# Patient Record
Sex: FEMALE | Race: OTHER | HISPANIC OR LATINO
[De-identification: names, ages, dates, MRNs, and addresses within clinical notes are randomized per-mention and may not be internally consistent; named-entity substitution may affect disease eponyms.]

---

## 2017-10-02 ENCOUNTER — APPOINTMENT (OUTPATIENT)
Dept: MAMMOGRAPHY | Facility: CLINIC | Age: 62
End: 2017-10-02
Payer: COMMERCIAL

## 2017-10-02 ENCOUNTER — OUTPATIENT (OUTPATIENT)
Dept: OUTPATIENT SERVICES | Facility: HOSPITAL | Age: 62
LOS: 1 days | End: 2017-10-02
Payer: COMMERCIAL

## 2017-10-02 DIAGNOSIS — Z00.8 ENCOUNTER FOR OTHER GENERAL EXAMINATION: ICD-10-CM

## 2017-10-02 PROCEDURE — 77063 BREAST TOMOSYNTHESIS BI: CPT

## 2017-10-02 PROCEDURE — G0202: CPT | Mod: 26

## 2017-10-02 PROCEDURE — 77067 SCR MAMMO BI INCL CAD: CPT

## 2017-10-02 PROCEDURE — 77063 BREAST TOMOSYNTHESIS BI: CPT | Mod: 26

## 2018-11-06 ENCOUNTER — APPOINTMENT (OUTPATIENT)
Dept: MAMMOGRAPHY | Facility: CLINIC | Age: 63
End: 2018-11-06

## 2018-11-06 ENCOUNTER — OUTPATIENT (OUTPATIENT)
Dept: OUTPATIENT SERVICES | Facility: HOSPITAL | Age: 63
LOS: 1 days | End: 2018-11-06
Payer: COMMERCIAL

## 2018-11-06 DIAGNOSIS — Z00.00 ENCOUNTER FOR GENERAL ADULT MEDICAL EXAMINATION WITHOUT ABNORMAL FINDINGS: ICD-10-CM

## 2018-11-06 PROCEDURE — 77063 BREAST TOMOSYNTHESIS BI: CPT

## 2018-11-06 PROCEDURE — 77063 BREAST TOMOSYNTHESIS BI: CPT | Mod: 26

## 2018-11-06 PROCEDURE — 77067 SCR MAMMO BI INCL CAD: CPT | Mod: 26

## 2018-11-06 PROCEDURE — 77067 SCR MAMMO BI INCL CAD: CPT

## 2019-02-20 ENCOUNTER — INPATIENT (INPATIENT)
Facility: HOSPITAL | Age: 64
LOS: 0 days | Discharge: HOME | DRG: 392 | End: 2019-02-21
Attending: INTERNAL MEDICINE | Admitting: INTERNAL MEDICINE
Payer: COMMERCIAL

## 2019-02-20 VITALS
SYSTOLIC BLOOD PRESSURE: 184 MMHG | DIASTOLIC BLOOD PRESSURE: 77 MMHG | HEIGHT: 63 IN | RESPIRATION RATE: 20 BRPM | TEMPERATURE: 99 F | OXYGEN SATURATION: 100 % | WEIGHT: 139.99 LBS | HEART RATE: 80 BPM

## 2019-02-20 DIAGNOSIS — R07.9 CHEST PAIN, UNSPECIFIED: ICD-10-CM

## 2019-02-20 LAB
ALBUMIN SERPL ELPH-MCNC: 4.9 G/DL — SIGNIFICANT CHANGE UP (ref 3.3–5.2)
ALP SERPL-CCNC: 64 U/L — SIGNIFICANT CHANGE UP (ref 40–120)
ALT FLD-CCNC: 43 U/L — HIGH
ANION GAP SERPL CALC-SCNC: 12 MMOL/L — SIGNIFICANT CHANGE UP (ref 5–17)
APTT BLD: 28.8 SEC — SIGNIFICANT CHANGE UP (ref 27.5–36.3)
AST SERPL-CCNC: 31 U/L — SIGNIFICANT CHANGE UP
BILIRUB SERPL-MCNC: 0.5 MG/DL — SIGNIFICANT CHANGE UP (ref 0.4–2)
BUN SERPL-MCNC: 21 MG/DL — HIGH (ref 8–20)
CALCIUM SERPL-MCNC: 10.3 MG/DL — HIGH (ref 8.6–10.2)
CHLORIDE SERPL-SCNC: 101 MMOL/L — SIGNIFICANT CHANGE UP (ref 98–107)
CK SERPL-CCNC: 79 U/L — SIGNIFICANT CHANGE UP (ref 25–170)
CO2 SERPL-SCNC: 27 MMOL/L — SIGNIFICANT CHANGE UP (ref 22–29)
CREAT SERPL-MCNC: 0.86 MG/DL — SIGNIFICANT CHANGE UP (ref 0.5–1.3)
GLUCOSE BLDC GLUCOMTR-MCNC: 116 MG/DL — HIGH (ref 70–99)
GLUCOSE SERPL-MCNC: 83 MG/DL — SIGNIFICANT CHANGE UP (ref 70–115)
HCT VFR BLD CALC: 37.4 % — SIGNIFICANT CHANGE UP (ref 37–47)
HGB BLD-MCNC: 12.7 G/DL — SIGNIFICANT CHANGE UP (ref 12–16)
INR BLD: 1.04 RATIO — SIGNIFICANT CHANGE UP (ref 0.88–1.16)
MAGNESIUM SERPL-MCNC: 2 MG/DL — SIGNIFICANT CHANGE UP (ref 1.6–2.6)
MCHC RBC-ENTMCNC: 31.1 PG — HIGH (ref 27–31)
MCHC RBC-ENTMCNC: 34 G/DL — SIGNIFICANT CHANGE UP (ref 32–36)
MCV RBC AUTO: 91.4 FL — SIGNIFICANT CHANGE UP (ref 81–99)
NT-PROBNP SERPL-SCNC: 279 PG/ML — SIGNIFICANT CHANGE UP (ref 0–300)
PLATELET # BLD AUTO: 213 K/UL — SIGNIFICANT CHANGE UP (ref 150–400)
POTASSIUM SERPL-MCNC: 4.1 MMOL/L — SIGNIFICANT CHANGE UP (ref 3.5–5.3)
POTASSIUM SERPL-SCNC: 4.1 MMOL/L — SIGNIFICANT CHANGE UP (ref 3.5–5.3)
PROT SERPL-MCNC: 7.8 G/DL — SIGNIFICANT CHANGE UP (ref 6.6–8.7)
PROTHROM AB SERPL-ACNC: 12 SEC — SIGNIFICANT CHANGE UP (ref 10–12.9)
RBC # BLD: 4.09 M/UL — LOW (ref 4.4–5.2)
RBC # FLD: 13.5 % — SIGNIFICANT CHANGE UP (ref 11–15.6)
SODIUM SERPL-SCNC: 140 MMOL/L — SIGNIFICANT CHANGE UP (ref 135–145)
TROPONIN T SERPL-MCNC: <0.01 NG/ML — SIGNIFICANT CHANGE UP (ref 0–0.06)
TROPONIN T SERPL-MCNC: <0.01 NG/ML — SIGNIFICANT CHANGE UP (ref 0–0.06)
WBC # BLD: 6 K/UL — SIGNIFICANT CHANGE UP (ref 4.8–10.8)
WBC # FLD AUTO: 6 K/UL — SIGNIFICANT CHANGE UP (ref 4.8–10.8)

## 2019-02-20 PROCEDURE — 99291 CRITICAL CARE FIRST HOUR: CPT

## 2019-02-20 PROCEDURE — 71045 X-RAY EXAM CHEST 1 VIEW: CPT | Mod: 26

## 2019-02-20 PROCEDURE — 99223 1ST HOSP IP/OBS HIGH 75: CPT

## 2019-02-20 PROCEDURE — 74177 CT ABD & PELVIS W/CONTRAST: CPT | Mod: 26

## 2019-02-20 PROCEDURE — 93010 ELECTROCARDIOGRAM REPORT: CPT | Mod: 77

## 2019-02-20 RX ORDER — DEXTROSE 50 % IN WATER 50 %
25 SYRINGE (ML) INTRAVENOUS ONCE
Qty: 0 | Refills: 0 | Status: DISCONTINUED | OUTPATIENT
Start: 2019-02-20 | End: 2019-02-21

## 2019-02-20 RX ORDER — SODIUM CHLORIDE 9 MG/ML
1000 INJECTION INTRAMUSCULAR; INTRAVENOUS; SUBCUTANEOUS ONCE
Qty: 0 | Refills: 0 | Status: COMPLETED | OUTPATIENT
Start: 2019-02-20 | End: 2019-02-20

## 2019-02-20 RX ORDER — DEXTROSE 50 % IN WATER 50 %
15 SYRINGE (ML) INTRAVENOUS ONCE
Qty: 0 | Refills: 0 | Status: DISCONTINUED | OUTPATIENT
Start: 2019-02-20 | End: 2019-02-21

## 2019-02-20 RX ORDER — GLUCAGON INJECTION, SOLUTION 0.5 MG/.1ML
1 INJECTION, SOLUTION SUBCUTANEOUS ONCE
Qty: 0 | Refills: 0 | Status: DISCONTINUED | OUTPATIENT
Start: 2019-02-20 | End: 2019-02-21

## 2019-02-20 RX ORDER — ONDANSETRON 8 MG/1
8 TABLET, FILM COATED ORAL ONCE
Qty: 0 | Refills: 0 | Status: DISCONTINUED | OUTPATIENT
Start: 2019-02-20 | End: 2019-02-20

## 2019-02-20 RX ORDER — ONDANSETRON 8 MG/1
4 TABLET, FILM COATED ORAL ONCE
Qty: 0 | Refills: 0 | Status: COMPLETED | OUTPATIENT
Start: 2019-02-20 | End: 2019-02-20

## 2019-02-20 RX ORDER — FENOFIBRATE,MICRONIZED 130 MG
0 CAPSULE ORAL
Qty: 0 | Refills: 0 | COMMUNITY

## 2019-02-20 RX ORDER — DIPHENHYDRAMINE HCL 50 MG
25 CAPSULE ORAL ONCE
Qty: 0 | Refills: 0 | Status: COMPLETED | OUTPATIENT
Start: 2019-02-20 | End: 2019-02-20

## 2019-02-20 RX ORDER — ASPIRIN/CALCIUM CARB/MAGNESIUM 324 MG
81 TABLET ORAL DAILY
Qty: 0 | Refills: 0 | Status: DISCONTINUED | OUTPATIENT
Start: 2019-02-20 | End: 2019-02-21

## 2019-02-20 RX ORDER — METFORMIN HYDROCHLORIDE 850 MG/1
0 TABLET ORAL
Qty: 0 | Refills: 0 | COMMUNITY

## 2019-02-20 RX ORDER — SODIUM CHLORIDE 9 MG/ML
1000 INJECTION, SOLUTION INTRAVENOUS
Qty: 0 | Refills: 0 | Status: DISCONTINUED | OUTPATIENT
Start: 2019-02-20 | End: 2019-02-21

## 2019-02-20 RX ORDER — HEPARIN SODIUM 5000 [USP'U]/ML
5000 INJECTION INTRAVENOUS; SUBCUTANEOUS EVERY 8 HOURS
Qty: 0 | Refills: 0 | Status: DISCONTINUED | OUTPATIENT
Start: 2019-02-20 | End: 2019-02-21

## 2019-02-20 RX ORDER — MORPHINE SULFATE 50 MG/1
4 CAPSULE, EXTENDED RELEASE ORAL ONCE
Qty: 0 | Refills: 0 | Status: DISCONTINUED | OUTPATIENT
Start: 2019-02-20 | End: 2019-02-20

## 2019-02-20 RX ORDER — INSULIN LISPRO 100/ML
VIAL (ML) SUBCUTANEOUS
Qty: 0 | Refills: 0 | Status: DISCONTINUED | OUTPATIENT
Start: 2019-02-20 | End: 2019-02-21

## 2019-02-20 RX ORDER — DEXTROSE 50 % IN WATER 50 %
12.5 SYRINGE (ML) INTRAVENOUS ONCE
Qty: 0 | Refills: 0 | Status: DISCONTINUED | OUTPATIENT
Start: 2019-02-20 | End: 2019-02-21

## 2019-02-20 RX ORDER — ASPIRIN/CALCIUM CARB/MAGNESIUM 324 MG
325 TABLET ORAL ONCE
Qty: 0 | Refills: 0 | Status: COMPLETED | OUTPATIENT
Start: 2019-02-20 | End: 2019-02-20

## 2019-02-20 RX ORDER — PANTOPRAZOLE SODIUM 20 MG/1
40 TABLET, DELAYED RELEASE ORAL
Qty: 0 | Refills: 0 | Status: DISCONTINUED | OUTPATIENT
Start: 2019-02-20 | End: 2019-02-21

## 2019-02-20 RX ADMIN — HEPARIN SODIUM 5000 UNIT(S): 5000 INJECTION INTRAVENOUS; SUBCUTANEOUS at 22:57

## 2019-02-20 RX ADMIN — MORPHINE SULFATE 4 MILLIGRAM(S): 50 CAPSULE, EXTENDED RELEASE ORAL at 16:25

## 2019-02-20 RX ADMIN — Medication 25 MILLIGRAM(S): at 16:25

## 2019-02-20 RX ADMIN — SODIUM CHLORIDE 1000 MILLILITER(S): 9 INJECTION INTRAMUSCULAR; INTRAVENOUS; SUBCUTANEOUS at 14:19

## 2019-02-20 RX ADMIN — MORPHINE SULFATE 4 MILLIGRAM(S): 50 CAPSULE, EXTENDED RELEASE ORAL at 17:19

## 2019-02-20 RX ADMIN — Medication 325 MILLIGRAM(S): at 13:42

## 2019-02-20 RX ADMIN — ONDANSETRON 4 MILLIGRAM(S): 8 TABLET, FILM COATED ORAL at 18:19

## 2019-02-20 NOTE — H&P ADULT - NSHPREVIEWOFSYSTEMS_GEN_ALL_CORE
CONSTITUTIONAL:  No distress.no fever/chills/fatigue/weight loss  HEENT:  Eyes:  No diplopia or blurred vision.   CARDIOVASCULAR:  No pressure, squeezing, tightness, heaviness or aching about the chest; no palpitations.no leg swelling, no orthopnea or PND  RESPIRATORY:  no SOB. no wheezing.no cough or sputum.  No hemoptysis  GI: no nausea, no vomiting, no diarrhea, no constipation. No hematochezia or melena  EXT:No joint pain or joint swelling or redness  SKIN: no skin break or ulcer. No cellulitis.   CNS: No headaches. No weakness.no numbness. No depression or anxiety. No SI

## 2019-02-20 NOTE — ED STATDOCS - PROGRESS NOTE DETAILS
Bee Kevin for Dr Jeramie Barrett : Pt is a 62 y/o F, with PMHx of DM, HTN, and HLD, presenting to the ED with c/o abdominal pain for the last three weeks. Pt states the pain originates in her back, wraps around to her upper abd and gradually radiating up to her chest. Pain is intermittent but waxing and waning. No exacerbating factors and she notes the pain is temporarily improved with aleve. Denies associated N/V/D, fever, urinary sxs, and SOB. Had an US done about 4 days ago ordered by PMD that was negative and referred to the ED for CCT scan and further evaluation of her sxs. will send to main.

## 2019-02-20 NOTE — ED PROVIDER NOTE - CARE PLAN
Principal Discharge DX:	Chest pain with high risk of acute coronary syndrome  Secondary Diagnosis:	DM (diabetes mellitus)  Secondary Diagnosis:	HLD (hyperlipidemia)  Secondary Diagnosis:	HTN (hypertension)

## 2019-02-20 NOTE — H&P ADULT - NSHPLABSRESULTS_GEN_ALL_CORE
12.7   6.0   )-----------( 213      ( 20 Feb 2019 13:33 )             37.4       02-20    140  |  101  |  21.0<H>  ----------------------------<  83  4.1   |  27.0  |  0.86    Ca    10.3<H>      20 Feb 2019 13:33  Mg     2.0     02-20    TPro  7.8  /  Alb  4.9  /  TBili  0.5  /  DBili  x   /  AST  31  /  ALT  43<H>  /  AlkPhos  64  02-20    Impression:    Unremarkable exam.    < from: CT Abdomen and Pelvis w/ IV Cont (02.20.19 @ 15:12) >    IMPRESSION:     Unremarkable CT scan of the abdomen and pelvis..     < end of copied text >

## 2019-02-20 NOTE — H&P ADULT - NSHPPHYSICALEXAM_GEN_ALL_CORE
Vital Signs Last 24 Hrs  T(C): 37 (20 Feb 2019 12:56), Max: 37 (20 Feb 2019 12:56)  T(F): 98.6 (20 Feb 2019 12:56), Max: 98.6 (20 Feb 2019 12:56)  HR: 78 (20 Feb 2019 16:23) (71 - 80)  BP: 150/76 (20 Feb 2019 17:03) (150/76 - 184/77)  BP(mean): --  RR: 18 (20 Feb 2019 16:23) (18 - 20)  SpO2: 100% (20 Feb 2019 16:23) (98% - 100%)    CAPILLARY BLOOD GLUCOSE Vital Signs Last 24 Hrs  T(C): 37 (20 Feb 2019 12:56), Max: 37 (20 Feb 2019 12:56)  T(F): 98.6 (20 Feb 2019 12:56), Max: 98.6 (20 Feb 2019 12:56)  HR: 78 (20 Feb 2019 16:23) (71 - 80)  BP: 150/76 (20 Feb 2019 17:03) (150/76 - 184/77)  BP(mean): --  RR: 18 (20 Feb 2019 16:23) (18 - 20)  SpO2: 100% (20 Feb 2019 16:23) (98% - 100%)    CAPILLARY BLOOD GLUCOSE    GENERAL: Not in distress. Alert    HEENT:  Normocephalic and atraumatic. PEARLA,EOMI  NECK: Supple.  No JVD.    CARDIOVASCULAR: RRR S1, S2. No murmur/rubs/gallop  LUNGS: BLAE+, no rales, no wheezing, no rhonchi.    ABDOMEN: ND. Soft,  NT, no guarding / rebound / rigidity. BS normoactive. No CVA tenderness.    BACK: No spine tenderness.  EXTREMITIES: no cyanosis, no clubbing, no edema.   SKIN: no rash. No skin break or ulcer. No cellulitis.  NEUROLOGIC: AAO*3.strength is symmetric, sensation intact, speech fluent.    PSYCHIATRIC: Calm.  No agitation.

## 2019-02-20 NOTE — ED ADULT NURSE NOTE - OBJECTIVE STATEMENT
Assumed pt care at 1335.  Pt a&ox4 c/o 8/10 b/L upper abdominal pain, states it started 3 weeks ago and has been experiencing it intermittently, denies n/v/d, states she had ultrasound done and was negative.  No acute s/s of respiratory distress noted or reported at this time, will continue to monitor

## 2019-02-20 NOTE — H&P ADULT - ASSESSMENT
Pt is a 64 y/o F, with PMHx of DM, HTN, and HLD, presenting to the ED with c/o abdominal pain for the last three weeks. Pt states the pain originates in her back, wraps around to her upper abd and gradually radiating up to her chest. Pain is intermittent but waxing and waning. No exacerbating factors and she notes the pain is temporarily improved with aleve. Denies associated N/V/D, fever, urinary sxs, and SOB. Had an US done about 4 days ago ordered by PMD that was negative and referred to the ED for further eval. in ER, CE and EKg neg, CT abd/pelvis and CXR neg. admitted to tele to r/o ACS as high risk of CAD. patient has no GI or cardiac history. colonoscopy 3 yrs ago was normal. never had endoscopy. never smoker. follows with Dr. Juan David Sandra. [facility] 'for routine". patient is currently asymptomatic    A/P:    EPigastric discomfort: possibly GI, to r/o ACS [ risk DM, HTn]  admitted to tele  seen by cardiology. suggested ACS unlikely  will trend CE and EKG  Echo  s/p asa 325 mg in ER. c/w asa 81 daily  IV protonix BID  pain control  A1c, lipid  collect previous record from her cardiologist  cardiology cx appreciated  GI eval OP    HTN: c/w home meds    DM: hold metformin for 48 hrs as she got IV dye  ISS, accucheck, DM diet    DVT-P: lovenox Pt is a 62 y/o F, with PMHx of DM, HTN, and HLD, presenting to the ED with c/o abdominal pain for the last three weeks, worsening today. Pt states the pain originates in her back, wraps around to her upper abd and gradually radiating up to her chest. Pain is intermittent but waxing and waning. No exacerbating factors and she notes the pain is temporarily improved with aleve. Denies associated N/V/D, fever, urinary sxs, and SOB. Had an US done about 4 days ago ordered by PMD that was negative and referred to the ED for further eval. in ER, CE and EKg neg, CT abd/pelvis and CXR neg. admitted to tele to r/o ACS as high risk of CAD. patient has no GI or cardiac history. colonoscopy 3 yrs ago was normal. never had endoscopy. never smoker. follows with Dr. Juan David Sandra. [facility] 'for routine". patient is currently asymptomatic    A/P:    EPigastric pain going to chest: possibly GI, to r/o ACS [ risk DM, HTn]  admitted to tele  seen by cardiology. suggested ACS unlikely  will trend CE and EKG  Echo if not done recently at her cardiology offcie  s/p asa 325 mg in ER. c/w asa 81 daily  IV protonix BID  pain control  A1c, lipid  collect previous record from her cardiologist  cardiology cx appreciated  GI eval OP    HTN: c/w home meds    DM: hold metformin for 48 hrs as she got IV dye  ISS, accucheck, DM diet    DVT-P: lovenox

## 2019-02-20 NOTE — ED ADULT NURSE REASSESSMENT NOTE - NS ED NURSE REASSESS COMMENT FT1
assumed care of patient 1930 charting as noted. pt alert and oriented x4 in  no current distress. pt denies pain. Normal Sinus Rhythm on monitor. VSS afebrile. pt educated on plan of care, pt able to successfully teach back plan of care to RN, RN will continue to reeducate pt during hospital stay.

## 2019-02-20 NOTE — CONSULT NOTE ADULT - ATTENDING COMMENTS
Pt's EKG demonstrates normal TWi in V1, and biphasic in V2. This is not consistent with acute ischemic changes. Will obtain records from outpt cardiologist but no further cardiac w/u required at this time.

## 2019-02-20 NOTE — ED PROVIDER NOTE - OBJECTIVE STATEMENT
The patient is a 63 year old female presents with abd pain in the right and left upper abd area for 3 weeks and no chest pain, no SOB, no nausea, no vomiting. US performed outside was normal

## 2019-02-20 NOTE — ED PROVIDER NOTE - CLINICAL SUMMARY MEDICAL DECISION MAKING FREE TEXT BOX
The patient presents with epigastric pain that moves up and lateral with normal abd CT and history of DM, HLD, HTN concerning for ACS

## 2019-02-20 NOTE — CONSULT NOTE ADULT - ASSESSMENT
63 year old  female, non-smoker, with a known history/risk factors of DM, HTN, and HLD admitted with bilateral upper abdominal pain. EKG with T wave inversions in V1 and ST segment flattened V2    - CE x 1 negative.   - old records (EKG and Echo/Stress test from August) from Dr. Sandra's office can be obtained tomorrow at 9AM when office reopens. Please call above number.   - Symptoms not typical for a patient with ACS. EKG not specific for ischemia. No Q waves in precordial leads  - Would obtain second set CE.   - Consider GI consult  - No acute Cardiology interventions at this time.

## 2019-02-20 NOTE — CONSULT NOTE ADULT - SUBJECTIVE AND OBJECTIVE BOX
History obtained from chart, patient and family at bedside    63 year old predominantly [place] speaking female patient with a known history of DM, HTN, and HLD, who presents to the ER with a 3 week history of worsening bilateral upper abdominal pain radiating to the mid chest/epigastric area. The patient describes the pain as bandlike orginating in her back, wraps around to her upper abd and gradually radiating up to her chest. Pain is intermittent but waxing and waning. No exacerbating factors and she notes the pain is temporarily improved with Aleve. She does admit to mild nausea today. She has never felt this type of pain before. Today the pain today was 10/10 and unbearable.   She reports she had an Echo and Stress Test with Dr. Sandra in August. She otherwise denies dyspnea, fever, chills, change in bowel habits.     Cardiology: Dr. Juan David Sandra. [facility]: [phone]  PMD: Dr. Welch     Had an US done about 4 days ago ordered by PMD that was negative.     PAST MEDICAL & SURGICAL HISTORY:  HTN  HLD  DM  Bladder surgery 2010    REVIEW OF SYSTEMS  General: - fever or chills	  Skin/Breast: - rashes  Ophthalmologic: - blurred vision	  ENMT: - sore throat  Respiratory and Thorax: - dyspnea	  Cardiovascular:	- chest pain, - palpitations  Gastrointestinal:	+ nausea, - vomiting  Genitourinary: - dysuria  Musculoskeletal:	 - arthritis  Neurological: - weaknesses  Psychiatric: - anxiety or depression  Hematology/Lymphatics:	 - bleeding disorder  Endocrine: - heat or cold intolerance.     MEDICATIONS  (STANDING):  Metformin  Lisinopril  Fenofibrate  MVI    Allergies  No Known Allergies    SOCIAL HISTORY: non-smoker, non drinker. No illicit drug use    FAMILY HISTORY: no family history of sudden death or arrhythmias.      Vital Signs Last 24 Hrs  T(C): 37 (20 Feb 2019 12:56), Max: 37 (20 Feb 2019 12:56)  T(F): 98.6 (20 Feb 2019 12:56), Max: 98.6 (20 Feb 2019 12:56)  HR: 78 (20 Feb 2019 16:23) (71 - 80)  BP: 150/76 (20 Feb 2019 17:03) (150/76 - 184/77)  RR: 18 (20 Feb 2019 16:23) (18 - 20)  SpO2: 100% (20 Feb 2019 16:23) (98% - 100%)    Physical Exam:  Constitutional: AAOx3, NAD  Neck: supple, No JVD  Cardiovascular: +S1S2 RRR, no murmurs, rubs, gallops   Pulmonary: CTA b/l, unlabored, no wheezes, rales. No rhonchi  Abdomen: +BS, soft NTND  Extremities: no edema b/l,   Neuro: non focal, speech clear, HUBER x 4  Psych: Appropriate mood and affect.     LABS:                        12.7   6.0   )-----------( 213      ( 20 Feb 2019 13:33 )             37.4   02-20    140  |  101  |  21.0<H>  ----------------------------<  83  4.1   |  27.0  |  0.86  Ca    10.3<H>      20 Feb 2019 13:33  Mg     2.0     02-20  TPro  7.8  /  Alb  4.9  /  TBili  0.5  /  DBili  x   /  AST  31  /  ALT  43<H>  /  AlkPhos  64  02-20  LIVER FUNCTIONS - ( 20 Feb 2019 13:33 )  Alb: 4.9 g/dL / Pro: 7.8 g/dL / ALK PHOS: 64 U/L / ALT: 43 U/L / AST: 31 U/L / GGT: x         PT/INR - ( 20 Feb 2019 13:33 )   PT: 12.0 sec;   INR: 1.04 ratio    PTT - ( 20 Feb 2019 13:33 )  PTT:28.8 secCARDIAC MARKERS ( 20 Feb 2019 13:33 )  x     / <0.01 ng/mL / x     / x     / x        RADIOLOGY & ADDITIONAL STUDIES:  CT Abdomen and Pelvis 2/20/19  IMPRESSION:     Unremarkable CT scan of the abdomen and pelvis.    CXR 2/20/19  Findings:  The lungs are clear. The heart and mediastinum are unremarkable.  No   hilar abnormality is seen.  There is no evidence of pleural effusion. The   visualized osseous structures demonstrate degenerative changes in the   spine.  Impression:  Unremarkable exam.    EKG: SR at 68bpm; qRSD 82ms; T wave flipped V1, and flattened ST segment V2.   Telemetry: SR in 80s. No arrhythmias recorded.     A/P  63 year old  female, non-smoker, with a known history/risk factors of DM, HTN, and HLD admitted with bilateral upper abdominal pain. EKG with T wave inversions in V1 and ST segment flattened V2    - CE x 1 negative.   - old records (EKG and Echo/Stress test from August) from Dr. Sandra's office can be obtained tomorrow at 9AM when office reopens.   - Symptoms not typical for a patient with ACS. EKG not specific for ischemia.   - Would obtain second set CE.   - Consider GI consult History obtained from chart, patient and family at bedside    63 year old predominantly Burkinan speaking female patient with a known history of DM, HTN, and HLD, who presents to the ER with a 3 week history of worsening bilateral upper abdominal pain radiating to the mid chest/epigastric area. The patient describes the pain as bandlike orginating in her back, wraps around to her upper abd and gradually radiating up to her chest. Pain is intermittent but waxing and waning. No exacerbating factors and she notes the pain is temporarily improved with Aleve. She does admit to mild nausea today. She has never felt this type of pain before. Today the pain today was 10/10 and unbearable.   She reports she had an Echo and Stress Test with Dr. Sandra in August. She otherwise denies dyspnea, fever, chills, change in bowel habits.     Cardiology: Dr. Juan David Sandra. [facility]: [phone]  PMD: Dr. Welch     Had an US done about 4 days ago ordered by PMD that was negative.     PAST MEDICAL & SURGICAL HISTORY:  HTN  HLD  DM  Bladder surgery 2010    REVIEW OF SYSTEMS  General: - fever or chills	  Skin/Breast: - rashes  Ophthalmologic: - blurred vision	  ENMT: - sore throat  Respiratory and Thorax: - dyspnea	  Cardiovascular:	- chest pain, - palpitations  Gastrointestinal:	+ nausea, - vomiting  Genitourinary: - dysuria  Musculoskeletal:	 - arthritis  Neurological: - weaknesses  Psychiatric: - anxiety or depression  Hematology/Lymphatics:	 - bleeding disorder  Endocrine: - heat or cold intolerance.     MEDICATIONS  (STANDING):  Metformin  Lisinopril  Fenofibrate  MVI    Allergies  No Known Allergies    SOCIAL HISTORY: non-smoker, non drinker. No illicit drug use    FAMILY HISTORY: no family history of sudden death or arrhythmias.      Vital Signs Last 24 Hrs  T(C): 37 (20 Feb 2019 12:56), Max: 37 (20 Feb 2019 12:56)  T(F): 98.6 (20 Feb 2019 12:56), Max: 98.6 (20 Feb 2019 12:56)  HR: 78 (20 Feb 2019 16:23) (71 - 80)  BP: 150/76 (20 Feb 2019 17:03) (150/76 - 184/77)  RR: 18 (20 Feb 2019 16:23) (18 - 20)  SpO2: 100% (20 Feb 2019 16:23) (98% - 100%)    Physical Exam:  Constitutional: AAOx3, NAD  Neck: supple, No JVD  Cardiovascular: +S1S2 RRR, no murmurs, rubs, gallops   Pulmonary: CTA b/l, unlabored, no wheezes, rales. No rhonchi  Abdomen: +BS, soft NTND  Extremities: no edema b/l,   Neuro: non focal, speech clear, HUBER x 4  Psych: Appropriate mood and affect.     LABS:                        12.7   6.0   )-----------( 213      ( 20 Feb 2019 13:33 )             37.4   02-20    140  |  101  |  21.0<H>  ----------------------------<  83  4.1   |  27.0  |  0.86  Ca    10.3<H>      20 Feb 2019 13:33  Mg     2.0     02-20  TPro  7.8  /  Alb  4.9  /  TBili  0.5  /  DBili  x   /  AST  31  /  ALT  43<H>  /  AlkPhos  64  02-20  LIVER FUNCTIONS - ( 20 Feb 2019 13:33 )  Alb: 4.9 g/dL / Pro: 7.8 g/dL / ALK PHOS: 64 U/L / ALT: 43 U/L / AST: 31 U/L / GGT: x         PT/INR - ( 20 Feb 2019 13:33 )   PT: 12.0 sec;   INR: 1.04 ratio    PTT - ( 20 Feb 2019 13:33 )  PTT:28.8 secCARDIAC MARKERS ( 20 Feb 2019 13:33 )  x     / <0.01 ng/mL / x     / x     / x        RADIOLOGY & ADDITIONAL STUDIES:  CT Abdomen and Pelvis 2/20/19  IMPRESSION:     Unremarkable CT scan of the abdomen and pelvis.    CXR 2/20/19  Findings:  The lungs are clear. The heart and mediastinum are unremarkable.  No   hilar abnormality is seen.  There is no evidence of pleural effusion. The   visualized osseous structures demonstrate degenerative changes in the   spine.  Impression:  Unremarkable exam.    EKG: SR at 68bpm; qRSD 82ms; T wave flipped V1, and flattened ST segment V2.   Telemetry: SR in 80s. No arrhythmias recorded.     A/P  63 year old  female, non-smoker, with a known history/risk factors of DM, HTN, and HLD admitted with bilateral upper abdominal pain. EKG with T wave inversions in V1 and ST segment flattened V2    - CE x 1 negative.   - old records (EKG and Echo/Stress test from August) from Dr. Sandra's office can be obtained tomorrow at 9AM when office reopens. Please call above number.   - Symptoms not typical for a patient with ACS. EKG not specific for ischemia. No Q waves in precordial leads  - Would obtain second set CE.   - Consider GI consult  - No acute Cardiology interventions at this time. History obtained from chart, patient and family at bedside    63 year old predominantly [place] citizen speaking female patient with a known history of DM, HTN, and HLD, who presents to the ER with a 3 week history of worsening bilateral upper abdominal pain radiating to the mid chest/epigastric area. The patient describes the pain as bandlike orginating in her back, wraps around to her upper abd and gradually radiating up to her chest. Pain is intermittent but waxing and waning. No exacerbating factors and she notes the pain is temporarily improved with Aleve. She does admit to mild nausea today. She has never felt this type of pain before. Today the pain today was 10/10 and unbearable.   She reports she had an Echo and Stress Test with Dr. Sandra in August. She otherwise denies dyspnea, fever, chills, change in bowel habits.     Cardiology: Dr. Juan David Sandra. [facility]: [phone]  PMD: Dr. Welch     Had an US done about 4 days ago ordered by PMD that was negative.     PAST MEDICAL & SURGICAL HISTORY:  HTN  HLD  DM  Bladder surgery 2010    REVIEW OF SYSTEMS  General: - fever or chills	  Skin/Breast: - rashes  Ophthalmologic: - blurred vision	  ENMT: - sore throat  Respiratory and Thorax: - dyspnea	  Cardiovascular:	- chest pain, - palpitations  Gastrointestinal:	+ nausea, - vomiting  Genitourinary: - dysuria  Musculoskeletal:	 - arthritis  Neurological: - weaknesses  Psychiatric: - anxiety or depression  Hematology/Lymphatics:	 - bleeding disorder  Endocrine: - heat or cold intolerance.     MEDICATIONS  (STANDING):  Metformin  Lisinopril  Fenofibrate  MVI    Allergies  No Known Allergies    SOCIAL HISTORY: non-smoker, non drinker. No illicit drug use    FAMILY HISTORY: no family history of sudden death or arrhythmias.      Vital Signs Last 24 Hrs  T(C): 37 (20 Feb 2019 12:56), Max: 37 (20 Feb 2019 12:56)  T(F): 98.6 (20 Feb 2019 12:56), Max: 98.6 (20 Feb 2019 12:56)  HR: 78 (20 Feb 2019 16:23) (71 - 80)  BP: 150/76 (20 Feb 2019 17:03) (150/76 - 184/77)  RR: 18 (20 Feb 2019 16:23) (18 - 20)  SpO2: 100% (20 Feb 2019 16:23) (98% - 100%)    Physical Exam:  Constitutional: AAOx3, NAD  Neck: supple, No JVD  Cardiovascular: +S1S2 RRR, no murmurs, rubs, gallops   Pulmonary: CTA b/l, unlabored, no wheezes, rales. No rhonchi  Abdomen: +BS, soft NTND  Extremities: no edema b/l,   Neuro: non focal, speech clear, HUBER x 4  Psych: Appropriate mood and affect.     LABS:                        12.7   6.0   )-----------( 213      ( 20 Feb 2019 13:33 )             37.4   02-20    140  |  101  |  21.0<H>  ----------------------------<  83  4.1   |  27.0  |  0.86  Ca    10.3<H>      20 Feb 2019 13:33  Mg     2.0     02-20  TPro  7.8  /  Alb  4.9  /  TBili  0.5  /  DBili  x   /  AST  31  /  ALT  43<H>  /  AlkPhos  64  02-20  LIVER FUNCTIONS - ( 20 Feb 2019 13:33 )  Alb: 4.9 g/dL / Pro: 7.8 g/dL / ALK PHOS: 64 U/L / ALT: 43 U/L / AST: 31 U/L / GGT: x         PT/INR - ( 20 Feb 2019 13:33 )   PT: 12.0 sec;   INR: 1.04 ratio    PTT - ( 20 Feb 2019 13:33 )  PTT:28.8 secCARDIAC MARKERS ( 20 Feb 2019 13:33 )  x     / <0.01 ng/mL / x     / x     / x        RADIOLOGY & ADDITIONAL STUDIES:  CT Abdomen and Pelvis 2/20/19  IMPRESSION:     Unremarkable CT scan of the abdomen and pelvis.    CXR 2/20/19  Findings:  The lungs are clear. The heart and mediastinum are unremarkable.  No   hilar abnormality is seen.  There is no evidence of pleural effusion. The   visualized osseous structures demonstrate degenerative changes in the   spine.  Impression:  Unremarkable exam.    EKG: SR at 68bpm; qRSD 82ms; T wave flipped V1, and flattened ST segment V2.   Telemetry: SR in 80s. No arrhythmias recorded.

## 2019-02-20 NOTE — ED ADULT NURSE REASSESSMENT NOTE - NS ED NURSE REASSESS COMMENT FT1
Pt remains a&ox4 resting comfortably with VSS and family at bedside, pt complained of 10/10 pain, MD made aware, was given 4mg morphine IVpush with positive effect, pt denies any c/o pain at this time, no acute s/s of respiratory distress noted or reported at this time, will continue to monitor

## 2019-02-20 NOTE — H&P ADULT - HISTORY OF PRESENT ILLNESS
Pt is a 62 y/o F, with PMHx of DM, HTN, and HLD, presenting to the ED with c/o abdominal pain for the last three weeks. Pt states the pain originates in her back, wraps around to her upper abd and gradually radiating up to her chest. Pain is intermittent but waxing and waning. No exacerbating factors and she notes the pain is temporarily improved with aleve. Denies associated N/V/D, fever, urinary sxs, and SOB. Had an US done about 4 days ago ordered by PMD that was negative and referred to the ED for further eval. in ER, CE and EKg neg, CT abd/pelvis and CXR neg. admitted to tele to r/o ACS as high risk of CAD Pt is a 62 y/o F, with PMHx of DM, HTN, and HLD, presenting to the ED with c/o abdominal pain for the last three weeks. Pt states the pain originates in her back, wraps around to her upper abd and gradually radiating up to her chest. Pain is intermittent but waxing and waning. No exacerbating factors and she notes the pain is temporarily improved with aleve. Denies associated N/V/D, fever, urinary sxs, and SOB. Had an US done about 4 days ago ordered by PMD that was negative and referred to the ED for further eval. in ER, CE and EKg neg, CT abd/pelvis and CXR neg. admitted to tele to r/o ACS as high risk of CAD. patient has no GI or cardiac history. colonoscopy 3 yrs ago was normal. never had endoscopy. never smoker. follows with Dr. Juan David Sandra. [facility] 'for routine". patient is currently asymptomatic

## 2019-02-20 NOTE — ED ADULT TRIAGE NOTE - CHIEF COMPLAINT QUOTE
Patient c/o of upper abdominal pain for 3 weeks, ultrasound was done last Saturday (negative), patient denies urinary symptoms/vomit/fever.

## 2019-02-21 ENCOUNTER — TRANSCRIPTION ENCOUNTER (OUTPATIENT)
Age: 64
End: 2019-02-21

## 2019-02-21 VITALS
DIASTOLIC BLOOD PRESSURE: 82 MMHG | TEMPERATURE: 98 F | SYSTOLIC BLOOD PRESSURE: 134 MMHG | OXYGEN SATURATION: 97 % | HEART RATE: 68 BPM | RESPIRATION RATE: 18 BRPM

## 2019-02-21 LAB
CHOLEST SERPL-MCNC: 143 MG/DL — SIGNIFICANT CHANGE UP (ref 110–199)
GLUCOSE BLDC GLUCOMTR-MCNC: 104 MG/DL — HIGH (ref 70–99)
GLUCOSE BLDC GLUCOMTR-MCNC: 136 MG/DL — HIGH (ref 70–99)
HBA1C BLD-MCNC: 4.9 % — SIGNIFICANT CHANGE UP (ref 4–5.6)
HDLC SERPL-MCNC: 40 MG/DL — LOW
LIPID PNL WITH DIRECT LDL SERPL: 77 MG/DL — SIGNIFICANT CHANGE UP
TOTAL CHOLESTEROL/HDL RATIO MEASUREMENT: 4 RATIO — SIGNIFICANT CHANGE UP (ref 3.3–7.1)
TRIGL SERPL-MCNC: 132 MG/DL — SIGNIFICANT CHANGE UP (ref 10–200)
TROPONIN T SERPL-MCNC: <0.01 NG/ML — SIGNIFICANT CHANGE UP (ref 0–0.06)
TSH SERPL-MCNC: 3.81 UIU/ML — SIGNIFICANT CHANGE UP (ref 0.27–4.2)

## 2019-02-21 PROCEDURE — 99232 SBSQ HOSP IP/OBS MODERATE 35: CPT

## 2019-02-21 PROCEDURE — 99239 HOSP IP/OBS DSCHRG MGMT >30: CPT

## 2019-02-21 PROCEDURE — 93010 ELECTROCARDIOGRAM REPORT: CPT

## 2019-02-21 RX ORDER — MORPHINE SULFATE 50 MG/1
2 CAPSULE, EXTENDED RELEASE ORAL ONCE
Qty: 0 | Refills: 0 | Status: DISCONTINUED | OUTPATIENT
Start: 2019-02-21 | End: 2019-02-21

## 2019-02-21 RX ORDER — PANTOPRAZOLE SODIUM 20 MG/1
1 TABLET, DELAYED RELEASE ORAL
Qty: 60 | Refills: 0 | OUTPATIENT
Start: 2019-02-21 | End: 2019-03-22

## 2019-02-21 RX ORDER — ASPIRIN/CALCIUM CARB/MAGNESIUM 324 MG
1 TABLET ORAL
Qty: 30 | Refills: 0 | OUTPATIENT
Start: 2019-02-21 | End: 2019-03-22

## 2019-02-21 RX ORDER — LISINOPRIL 2.5 MG/1
0 TABLET ORAL
Qty: 0 | Refills: 0 | COMMUNITY

## 2019-02-21 RX ORDER — DOCUSATE SODIUM 100 MG
1 CAPSULE ORAL
Qty: 60 | Refills: 0 | OUTPATIENT
Start: 2019-02-21

## 2019-02-21 RX ORDER — SENNA PLUS 8.6 MG/1
2 TABLET ORAL
Qty: 20 | Refills: 0 | OUTPATIENT
Start: 2019-02-21

## 2019-02-21 RX ORDER — PANTOPRAZOLE SODIUM 20 MG/1
40 TABLET, DELAYED RELEASE ORAL
Qty: 0 | Refills: 0 | Status: DISCONTINUED | OUTPATIENT
Start: 2019-02-21 | End: 2019-02-21

## 2019-02-21 RX ADMIN — MORPHINE SULFATE 2 MILLIGRAM(S): 50 CAPSULE, EXTENDED RELEASE ORAL at 02:17

## 2019-02-21 RX ADMIN — Medication 81 MILLIGRAM(S): at 11:47

## 2019-02-21 RX ADMIN — HEPARIN SODIUM 5000 UNIT(S): 5000 INJECTION INTRAVENOUS; SUBCUTANEOUS at 05:30

## 2019-02-21 RX ADMIN — MORPHINE SULFATE 2 MILLIGRAM(S): 50 CAPSULE, EXTENDED RELEASE ORAL at 02:47

## 2019-02-21 RX ADMIN — PANTOPRAZOLE SODIUM 40 MILLIGRAM(S): 20 TABLET, DELAYED RELEASE ORAL at 05:30

## 2019-02-21 NOTE — PROGRESS NOTE ADULT - SUBJECTIVE AND OBJECTIVE BOX
Dr. Valentin Hospitalist Progress Note  ASHOK SAMSON [id number]    Patient is a 63y old  Female who presents with a chief complaint of abd pain (21 Feb 2019 10:40)    HPI:  Pt is a 64 y/o F, with PMHx of DM, HTN, and HLD, presenting to the ED with c/o abdominal pain for the last three weeks. Pt states the pain originates in her back, wraps around to her upper abd and gradually radiating up to her chest. Pain is intermittent but waxing and waning. No exacerbating factors and she notes the pain is temporarily improved with aleve. Denies associated N/V/D, fever, urinary sxs, and SOB. Had an US done about 4 days ago ordered by PMD that was negative and referred to the ED for further eval. in ER, CE and EKg neg, CT abd/pelvis and CXR neg. admitted to tele to r/o ACS as high risk of CAD. patient has no GI or cardiac history. colonoscopy 3 yrs ago was normal. never had endoscopy. never smoker. follows with Dr. Juan David Sandra. [facility] 'for routine". patient is currently asymptomatic (20 Feb 2019 17:27)    interval: one episode of same abd pain this am, lasted few min, no other symptoms, cardiac w/u neg. cardiology suggested possible GI source. cardio cleared for DC      ROS:  CONSTITUTIONAL:  No distress.no fever/chills/fatigue/weight loss  HEENT:  Eyes:  No diplopia or blurred vision.   CARDIOVASCULAR:  No pressure, squeezing, tightness, heaviness or aching about the chest; no palpitations.no leg swelling, no orthopnea or PND  RESPIRATORY:  no SOB. no wheezing.no cough or sputum.  No hemoptysis  GI: no nausea, no vomiting, no diarrhea, no constipation. No hematochezia or melena  EXT:No joint pain or joint swelling or redness  SKIN: no skin break or ulcer. No cellulitis.   CNS: No headaches. No weakness.no numbness. No depression or anxiety. No SI    T(C): 36.8 (02-21-19 @ 11:16), Max: 37.1 (02-20-19 @ 19:23)  HR: 67 (02-21-19 @ 11:16) (62 - 79)  BP: 124/80 (02-21-19 @ 11:16) (109/68 - 181/77)  RR: 20 (02-21-19 @ 11:16) (18 - 20)  SpO2: 98% (02-21-19 @ 11:16) (96% - 100%)  CAPILLARY BLOOD GLUCOSE      POCT Blood Glucose.: 136 mg/dL (21 Feb 2019 11:51)  POCT Blood Glucose.: 104 mg/dL (21 Feb 2019 07:55)  POCT Blood Glucose.: 116 mg/dL (20 Feb 2019 23:22)      Physical Exam:  GENERAL: Not in distress. Alert    HEENT:  Normocephalic and atraumatic. PEARLA,EOMI  NECK: Supple.  No JVD.    CARDIOVASCULAR: RRR S1, S2. No murmur/rubs/gallop  LUNGS: BLAE+, no rales, no wheezing, no rhonchi.    ABDOMEN: ND. Soft,  NT, no guarding / rebound / rigidity. BS normoactive. No CVA tenderness.    BACK: No spine tenderness.  EXTREMITIES: no cyanosis, no clubbing, no edema.   SKIN: no rash. No skin break or ulcer. No cellulitis.  NEUROLOGIC: AAO*3.strength is symmetric, sensation intact, speech fluent.    PSYCHIATRIC: Calm.  No agitation.    Labs                        12.7   6.0   )-----------( 213      ( 20 Feb 2019 13:33 )             37.4     02-20    140  |  101  |  21.0<H>  ----------------------------<  83  4.1   |  27.0  |  0.86    Ca    10.3<H>      20 Feb 2019 13:33  Mg     2.0     02-20    TPro  7.8  /  Alb  4.9  /  TBili  0.5  /  DBili  x   /  AST  31  /  ALT  43<H>  /  AlkPhos  64  02-20   PT/INR - ( 20 Feb 2019 13:33 )   PT: 12.0 sec;   INR: 1.04 ratio         PTT - ( 20 Feb 2019 13:33 )  PTT:28.8 sec  MEDICATIONS  (STANDING):  aspirin  chewable 81 milliGRAM(s) Oral daily  dextrose 5%. 1000 milliLiter(s) (50 mL/Hr) IV Continuous <Continuous>  dextrose 50% Injectable 12.5 Gram(s) IV Push once  dextrose 50% Injectable 25 Gram(s) IV Push once  dextrose 50% Injectable 25 Gram(s) IV Push once  heparin  Injectable 5000 Unit(s) SubCutaneous every 8 hours  insulin lispro (HumaLOG) corrective regimen sliding scale   SubCutaneous three times a day before meals  pantoprazole    Tablet 40 milliGRAM(s) Oral two times a day    MEDICATIONS  (PRN):  dextrose 40% Gel 15 Gram(s) Oral once PRN Blood Glucose LESS THAN 70 milliGRAM(s)/deciliter  glucagon  Injectable 1 milliGRAM(s) IntraMuscular once PRN Glucose LESS THAN 70 milligrams/deciliter Dr. Valentin Hospitalist Progress Note  ASHOK SAMSON [id number]    Patient is a 63y old  Female who presents with a chief complaint of abd pain (21 Feb 2019 10:40)    HPI:  Pt is a 62 y/o F, with PMHx of DM, HTN, and HLD, presenting to the ED with c/o abdominal pain for the last three weeks. Pt states the pain originates in her back, wraps around to her upper abd and gradually radiating up to her chest. Pain is intermittent but waxing and waning. No exacerbating factors and she notes the pain is temporarily improved with aleve. Denies associated N/V/D, fever, urinary sxs, and SOB. Had an US done about 4 days ago ordered by PMD that was negative and referred to the ED for further eval. in ER, CE and EKg neg, CT abd/pelvis and CXR neg. admitted to tele to r/o ACS as high risk of CAD. patient has no GI or cardiac history. colonoscopy 3 yrs ago was normal. never had endoscopy. never smoker. follows with Dr. Juan David Sandra. [facility] 'for routine". patient is currently asymptomatic (20 Feb 2019 17:27)    interval: one episode of same abd pain this am, lasted few min, no other symptoms, cardiac w/u neg. cardiology suggested possible GI source. cardio cleared for DC      ROS:  CONSTITUTIONAL:  No distress.no fever/chills/fatigue/weight loss  HEENT:  Eyes:  No diplopia or blurred vision.   CARDIOVASCULAR:  No pressure, squeezing, tightness, heaviness or aching about the chest; no palpitations.no leg swelling, no orthopnea or PND  RESPIRATORY:  no SOB. no wheezing.no cough or sputum.  No hemoptysis  GI: no nausea, no vomiting, no diarrhea, + constipation. No hematochezia or melena  EXT:No joint pain or joint swelling or redness  SKIN: no skin break or ulcer. No cellulitis.   CNS: No headaches. No weakness.no numbness. No depression or anxiety. No SI    T(C): 36.8 (02-21-19 @ 11:16), Max: 37.1 (02-20-19 @ 19:23)  HR: 67 (02-21-19 @ 11:16) (62 - 79)  BP: 124/80 (02-21-19 @ 11:16) (109/68 - 181/77)  RR: 20 (02-21-19 @ 11:16) (18 - 20)  SpO2: 98% (02-21-19 @ 11:16) (96% - 100%)  CAPILLARY BLOOD GLUCOSE      POCT Blood Glucose.: 136 mg/dL (21 Feb 2019 11:51)  POCT Blood Glucose.: 104 mg/dL (21 Feb 2019 07:55)  POCT Blood Glucose.: 116 mg/dL (20 Feb 2019 23:22)      Physical Exam:  GENERAL: Not in distress. Alert    HEENT:  Normocephalic and atraumatic. PEARLA,EOMI  NECK: Supple.  No JVD.    CARDIOVASCULAR: RRR S1, S2. No murmur/rubs/gallop  LUNGS: BLAE+, no rales, no wheezing, no rhonchi.    ABDOMEN: ND. Soft,  NT, no guarding / rebound / rigidity. BS normoactive. No CVA tenderness.    BACK: No spine tenderness.  EXTREMITIES: no cyanosis, no clubbing, no edema.   SKIN: no rash. No skin break or ulcer. No cellulitis.  NEUROLOGIC: AAO*3.strength is symmetric, sensation intact, speech fluent.    PSYCHIATRIC: Calm.  No agitation.    Labs                        12.7   6.0   )-----------( 213      ( 20 Feb 2019 13:33 )             37.4     02-20    140  |  101  |  21.0<H>  ----------------------------<  83  4.1   |  27.0  |  0.86    Ca    10.3<H>      20 Feb 2019 13:33  Mg     2.0     02-20    TPro  7.8  /  Alb  4.9  /  TBili  0.5  /  DBili  x   /  AST  31  /  ALT  43<H>  /  AlkPhos  64  02-20   PT/INR - ( 20 Feb 2019 13:33 )   PT: 12.0 sec;   INR: 1.04 ratio         PTT - ( 20 Feb 2019 13:33 )  PTT:28.8 sec  MEDICATIONS  (STANDING):  aspirin  chewable 81 milliGRAM(s) Oral daily  dextrose 5%. 1000 milliLiter(s) (50 mL/Hr) IV Continuous <Continuous>  dextrose 50% Injectable 12.5 Gram(s) IV Push once  dextrose 50% Injectable 25 Gram(s) IV Push once  dextrose 50% Injectable 25 Gram(s) IV Push once  heparin  Injectable 5000 Unit(s) SubCutaneous every 8 hours  insulin lispro (HumaLOG) corrective regimen sliding scale   SubCutaneous three times a day before meals  pantoprazole    Tablet 40 milliGRAM(s) Oral two times a day    MEDICATIONS  (PRN):  dextrose 40% Gel 15 Gram(s) Oral once PRN Blood Glucose LESS THAN 70 milliGRAM(s)/deciliter  glucagon  Injectable 1 milliGRAM(s) IntraMuscular once PRN Glucose LESS THAN 70 milligrams/deciliter

## 2019-02-21 NOTE — DISCHARGE NOTE ADULT - CARE PROVIDER_API CALL
Damian Aviles)  Gastroenterology; Internal Medicine  [address] [place] [address]  Phone: [phone]  Fax: [phone]  Follow Up Time: Damian Aviles)  Gastroenterology; Internal Medicine  [address] [place] [address]  Phone: [phone]  Fax: [phone]  Follow Up Time:     Juan David Sandra (DO)  Cardiology; Internal Medicine  [address] [place] [address]  Phone: [phone]  Fax: [phone]  Follow Up Time:

## 2019-02-21 NOTE — DISCHARGE NOTE ADULT - PATIENT PORTAL LINK FT
You can access the [facility] Patient Portal, offered by [facility], by registering with the following website: [email or web address]

## 2019-02-21 NOTE — PROGRESS NOTE ADULT - ASSESSMENT
63 year old  female, non-smoker, with a known history/risk factors of DM, HTN, and HLD admitted with bilateral upper abdominal pain. EKG with T wave inversions in V1 and ST segment flattened V2    - EKG reviewed from Dr. Sandra's office. Same pattern as EKGs obtained here. No ischemic changes present  - CE x 3 negative  - Patient may continue to follow up with Dr. Sandra  - No further cardiac work up necessary at this time. No obstruction to discharge from Cardiology perspective.

## 2019-02-21 NOTE — DISCHARGE NOTE ADULT - CARE PLAN
Principal Discharge DX:	Chest pain with high risk of acute coronary syndrome  Goal:	abdominal pain  Assessment and plan of treatment:	CT neg. cardiac w/u neg. possibly related to stomach/acid reflux/gastritis. please continue meds. see GI and cardiology in 1-2 week. see primary MD in 3-5 days. return to ER symptoms worseness or any new concerning symptoms  Secondary Diagnosis:	Type 2 diabetes mellitus without complication, without long-term current use of insulin  Assessment and plan of treatment:	do not take metformin today as you had CT done with dye. restart tomorrow morning. keep hydrated.  Secondary Diagnosis:	Essential hypertension  Assessment and plan of treatment:	continue meds. see primary MD in 3-5 days  Secondary Diagnosis:	Constipation, unspecified constipation type  Assessment and plan of treatment:	continue meds. see primary MD in 3-5 days. drink plenty of water to keep yourself hydrated. take high fiber diet Principal Discharge DX:	Chest pain with high risk of acute coronary syndrome  Goal:	abdominal pain  Assessment and plan of treatment:	CT neg. cardiac w/u neg. possibly related to stomach/acid reflux/gastritis. please continue meds. see GI and cardiology in 1-2 week. see primary MD in 3-5 days. return to ER symptoms worseness or any new concerning symptoms  Secondary Diagnosis:	Type 2 diabetes mellitus without complication, without long-term current use of insulin  Assessment and plan of treatment:	do not take metformin today as you had CT done with dye. restart tomorrow morning. keep hydrated.  Secondary Diagnosis:	Essential hypertension  Assessment and plan of treatment:	we held lisinopril while in hospital. your blood pressure was normal without it.. see primary MD in 3-5 days and get blood pressure checked and restart med if needed  Secondary Diagnosis:	Constipation, unspecified constipation type  Assessment and plan of treatment:	continue meds. see primary MD in 3-5 days. drink plenty of water to keep yourself hydrated. take high fiber diet

## 2019-02-21 NOTE — DISCHARGE NOTE ADULT - ADDITIONAL INSTRUCTIONS
please call to schedule appointments. bring all medication bottles and discharge papers to all health care visits.

## 2019-02-21 NOTE — DISCHARGE NOTE ADULT - HOSPITAL COURSE
Assessment and Plan:   · Assessment		  Pt is a 64 y/o F, with PMHx of DM, HTN, and HLD, presenting to the ED with c/o abdominal pain for the last three weeks, worsening today. Pt states the pain originates in her back, wraps around to her upper abd and gradually radiating up to her chest. Pain is intermittent but waxing and waning. No exacerbating factors and she notes the pain is temporarily improved with aleve. Denies associated N/V/D, fever, urinary sxs, and SOB. Had an US done about 4 days ago ordered by PMD that was negative and referred to the ED for further eval. in ER, CE and EKg neg, CT abd/pelvis and CXR neg. admitted to tele to r/o ACS as high risk of CAD. patient has no GI or cardiac history. colonoscopy 3 yrs ago was normal. never had endoscopy. never smoker. follows with Dr. Roscoe Sandra. [facility] 'for routine". patient is currently asymptomatic    A/P:    EPigastric pain going to chest: possibly GI [ gastritis/GERD/constipaiton] ACS ruled out [ risk DM, HTn]  cardio cleared for DC  c/w proronix BID PO  pain control  laxatives.  see GI in 1-2 weeks.   f/u with Dr. roscoe Sandra in 1 week  diet advised      HTN: c/w home meds    DM: hold metformin for 48 hrs as she got IV dye  ISS, accucheck, DM diet  DC with home meds    DVT-P: lovenox    plan of care discussed with the patient, return precautions discussed. patient and family verbalized understanding    son translated in Chadian. patient refused . she understands english little bit    time: 40 min

## 2019-02-21 NOTE — DISCHARGE NOTE ADULT - MEDICATION SUMMARY - MEDICATIONS TO STOP TAKING
I will STOP taking the medications listed below when I get home from the hospital:  None I will STOP taking the medications listed below when I get home from the hospital:    lisinopril 40 mg oral tablet

## 2019-02-21 NOTE — DISCHARGE NOTE ADULT - MEDICATION SUMMARY - MEDICATIONS TO TAKE
I will START or STAY ON the medications listed below when I get home from the hospital:    aspirin 81 mg oral tablet, chewable  -- 1 tab(s) by mouth once a day  -- Indication: For Chest pain    Percocet 5/325 oral tablet  -- 1 tab(s) by mouth 3 times a day as needed for severe pain MDD:max 3 tab per day  -- Caution federal law prohibits the transfer of this drug to any person other  than the person for whom it was prescribed.  May cause drowsiness.  Alcohol may intensify this effect.  Use care when operating dangerous machinery.  This prescription cannot be refilled.  This product contains acetaminophen.  Do not use  with any other product containing acetaminophen to prevent possible liver damage.  Using more of this medication than prescribed may cause serious breathing problems.    -- Indication: For pain    lisinopril 40 mg oral tablet  -- Indication: For HTN (hypertension)    metFORMIN 850 mg oral tablet  -- orally 2 times a day  -- Indication: For DM (diabetes mellitus)    fenofibrate 160 mg oral tablet  -- Indication: For HLD (hyperlipidemia)    pantoprazole 40 mg oral delayed release tablet  -- 1 tab(s) by mouth 2 times a day  -- Indication: For stomach/chest pain. acid I will START or STAY ON the medications listed below when I get home from the hospital:    aspirin 81 mg oral tablet, chewable  -- 1 tab(s) by mouth once a day  -- Indication: For Chest pain    Percocet 5/325 oral tablet  -- 1 tab(s) by mouth 3 times a day as needed for severe pain MDD:max 3 tab per day  -- Caution federal law prohibits the transfer of this drug to any person other  than the person for whom it was prescribed.  May cause drowsiness.  Alcohol may intensify this effect.  Use care when operating dangerous machinery.  This prescription cannot be refilled.  This product contains acetaminophen.  Do not use  with any other product containing acetaminophen to prevent possible liver damage.  Using more of this medication than prescribed may cause serious breathing problems.    -- Indication: For pain    metFORMIN 850 mg oral tablet  -- orally 2 times a day  -- Indication: For DM (diabetes mellitus)    fenofibrate 160 mg oral tablet  -- Indication: For HLD (hyperlipidemia)    Colace 100 mg oral capsule  -- 1 cap(s) by mouth 2 times a day   -- Medication should be taken with plenty of water.    -- Indication: For Constipation, unspecified constipation type    senna oral tablet  -- 2 tab(s) by mouth once a day (at bedtime) as needed for constipation  -- Indication: For Constipation, unspecified constipation type    pantoprazole 40 mg oral delayed release tablet  -- 1 tab(s) by mouth 2 times a day  -- Indication: For stomach/chest pain. acid

## 2019-02-21 NOTE — DISCHARGE NOTE ADULT - PLAN OF CARE
abdominal pain CT neg. cardiac w/u neg. possibly related to stomach/acid reflux/gastritis. please continue meds. see GI and cardiology in 1-2 week. see primary MD in 3-5 days. return to ER symptoms worseness or any new concerning symptoms do not take metformin today as you had CT done with dye. restart tomorrow morning. keep hydrated. continue meds. see primary MD in 3-5 days continue meds. see primary MD in 3-5 days. drink plenty of water to keep yourself hydrated. take high fiber diet we held lisinopril while in hospital. your blood pressure was normal without it.. see primary MD in 3-5 days and get blood pressure checked and restart med if needed

## 2019-02-21 NOTE — PROGRESS NOTE ADULT - SUBJECTIVE AND OBJECTIVE BOX
Patient seen today in bed. Reports she had similar pain this morning which brought her to the ER. It was relieved by morphine. She otherwise feels well.    EKG: NSR at 73bpm; QRSD 74ms  TELE: NSR with rates in the 60-80s. No acute telemetry events.     MEDICATIONS  (STANDING):  aspirin  chewable 81 milliGRAM(s) Oral daily  dextrose 5%. 1000 milliLiter(s) (50 mL/Hr) IV Continuous <Continuous>  dextrose 50% Injectable 12.5 Gram(s) IV Push once  dextrose 50% Injectable 25 Gram(s) IV Push once  dextrose 50% Injectable 25 Gram(s) IV Push once  heparin  Injectable 5000 Unit(s) SubCutaneous every 8 hours  insulin lispro (HumaLOG) corrective regimen sliding scale   SubCutaneous three times a day before meals  pantoprazole  Injectable 40 milliGRAM(s) IV Push two times a day    MEDICATIONS  (PRN):  dextrose 40% Gel 15 Gram(s) Oral once PRN Blood Glucose LESS THAN 70 milliGRAM(s)/deciliter  glucagon  Injectable 1 milliGRAM(s) IntraMuscular once PRN Glucose LESS THAN 70 milligrams/deciliter    Allergies  No Known Allergies    PAST MEDICAL & SURGICAL HISTORY:    Vital Signs Last 24 Hrs  T(C): 36.7 (21 Feb 2019 07:46), Max: 37.1 (20 Feb 2019 19:23)  T(F): 98.1 (21 Feb 2019 07:46), Max: 98.7 (20 Feb 2019 19:23)  HR: 62 (21 Feb 2019 07:46) (62 - 80)  BP: 117/76 (21 Feb 2019 07:46) (109/68 - 184/77)  RR: 18 (21 Feb 2019 07:46) (18 - 20)  SpO2: 96% (21 Feb 2019 07:46) (96% - 100%)    Physical Exam:  Constitutional: NAD, AAOx3  Cardiovascular: +S1S2 RRR  Pulmonary: CTA b/l, unlabored  GI: soft NTND +BS, No CVA tenderness, Negative Smith's sign.   Extremities: no pedal edema,   Neuro: non focal, HUBER x4    LABS:                        12.7   6.0   )-----------( 213      ( 20 Feb 2019 13:33 )             37.4     02-20    140  |  101  |  21.0<H>  ----------------------------<  83  4.1   |  27.0  |  0.86    Ca    10.3<H>      20 Feb 2019 13:33  Mg     2.0     02-20  TPro  7.8  /  Alb  4.9  /  TBili  0.5  /  DBili  x   /  AST  31  /  ALT  43<H>  /  AlkPhos  64  02-20  PT/INR - ( 20 Feb 2019 13:33 )   PT: 12.0 sec;   INR: 1.04 ratio    PTT - ( 20 Feb 2019 13:33 )  PTT:28.8 sec    A/P  63 year old  female, non-smoker, with a known history/risk factors of DM, HTN, and HLD admitted with bilateral upper abdominal pain. EKG with T wave inversions in V1 and ST segment flattened V2    - EKG reviewed from Dr. Sandra's office. Same pattern as EKGs obtained here. No ischemic changes present  - CE x 3 negative  - Patient may continue to follow up with Dr. Sandra  - No further cardiac work up necessary at this time. No obstruction to discharge from Cardiology perspective. Patient seen today in bed. Reports she had similar pain this morning which brought her to the ER. It was relieved by morphine. She otherwise feels well.    EKG: NSR at 73bpm; QRSD 74ms  TELE: NSR with rates in the 60-80s. No acute telemetry events.     MEDICATIONS  (STANDING):  aspirin  chewable 81 milliGRAM(s) Oral daily  dextrose 5%. 1000 milliLiter(s) (50 mL/Hr) IV Continuous <Continuous>  dextrose 50% Injectable 12.5 Gram(s) IV Push once  dextrose 50% Injectable 25 Gram(s) IV Push once  dextrose 50% Injectable 25 Gram(s) IV Push once  heparin  Injectable 5000 Unit(s) SubCutaneous every 8 hours  insulin lispro (HumaLOG) corrective regimen sliding scale   SubCutaneous three times a day before meals  pantoprazole  Injectable 40 milliGRAM(s) IV Push two times a day    MEDICATIONS  (PRN):  dextrose 40% Gel 15 Gram(s) Oral once PRN Blood Glucose LESS THAN 70 milliGRAM(s)/deciliter  glucagon  Injectable 1 milliGRAM(s) IntraMuscular once PRN Glucose LESS THAN 70 milligrams/deciliter    Allergies  No Known Allergies    PAST MEDICAL & SURGICAL HISTORY:    Vital Signs Last 24 Hrs  T(C): 36.7 (21 Feb 2019 07:46), Max: 37.1 (20 Feb 2019 19:23)  T(F): 98.1 (21 Feb 2019 07:46), Max: 98.7 (20 Feb 2019 19:23)  HR: 62 (21 Feb 2019 07:46) (62 - 80)  BP: 117/76 (21 Feb 2019 07:46) (109/68 - 184/77)  RR: 18 (21 Feb 2019 07:46) (18 - 20)  SpO2: 96% (21 Feb 2019 07:46) (96% - 100%)    Physical Exam:  Constitutional: NAD, AAOx3  Cardiovascular: +S1S2 RRR  Pulmonary: CTA b/l, unlabored  GI: soft NTND +BS, No CVA tenderness, Negative Smith's sign.   Extremities: no pedal edema,   Neuro: non focal, HUBER x4    LABS:                        12.7   6.0   )-----------( 213      ( 20 Feb 2019 13:33 )             37.4     02-20    140  |  101  |  21.0<H>  ----------------------------<  83  4.1   |  27.0  |  0.86    Ca    10.3<H>      20 Feb 2019 13:33  Mg     2.0     02-20  TPro  7.8  /  Alb  4.9  /  TBili  0.5  /  DBili  x   /  AST  31  /  ALT  43<H>  /  AlkPhos  64  02-20  PT/INR - ( 20 Feb 2019 13:33 )   PT: 12.0 sec;   INR: 1.04 ratio    PTT - ( 20 Feb 2019 13:33 )  PTT:28.8 sec

## 2019-02-21 NOTE — DISCHARGE NOTE ADULT - CARE PROVIDERS DIRECT ADDRESSES
[email or web address]

## 2019-02-21 NOTE — DISCHARGE NOTE ADULT - SECONDARY DIAGNOSIS.
Type 2 diabetes mellitus without complication, without long-term current use of insulin Essential hypertension Constipation, unspecified constipation type

## 2019-02-21 NOTE — DISCHARGE NOTE ADULT - PROVIDER TOKENS
PROVIDER:[TOKEN:[[id number]]] PROVIDER:[TOKEN:[[id number]]],PROVIDER:[TOKEN:[[id number]]]

## 2019-02-21 NOTE — PROGRESS NOTE ADULT - ASSESSMENT
Pt is a 62 y/o F, with PMHx of DM, HTN, and HLD, presenting to the ED with c/o abdominal pain for the last three weeks, worsening today. Pt states the pain originates in her back, wraps around to her upper abd and gradually radiating up to her chest. Pain is intermittent but waxing and waning. No exacerbating factors and she notes the pain is temporarily improved with aleve. Denies associated N/V/D, fever, urinary sxs, and SOB. Had an US done about 4 days ago ordered by PMD that was negative and referred to the ED for further eval. in ER, CE and EKg neg, CT abd/pelvis and CXR neg. admitted to tele to r/o ACS as high risk of CAD. patient has no GI or cardiac history. colonoscopy 3 yrs ago was normal. never had endoscopy. never smoker. follows with Dr. Juan David Sandra. [facility] 'for routine". patient is currently asymptomatic    A/P:    EPigastric pain going to chest: possibly GI, ACS ruled out [ risk DM, HTn]  cardio cleared for DC  c/w proronix BID PO  pain control  see GI in 1-2 weeks.   diet advised    HTN: c/w home meds    DM: hold metformin for 48 hrs as she got IV dye  ISS, accucheck, DM diet  DC with home meds    DVT-P: lovenox    plan of care discussed with the patient, return precautions discussed. patient and family verbalized understanding    son translated in Maldivian. ursula refused . she understands english little bit Pt is a 64 y/o F, with PMHx of DM, HTN, and HLD, presenting to the ED with c/o abdominal pain for the last three weeks, worsening today. Pt states the pain originates in her back, wraps around to her upper abd and gradually radiating up to her chest. Pain is intermittent but waxing and waning. No exacerbating factors and she notes the pain is temporarily improved with aleve. Denies associated N/V/D, fever, urinary sxs, and SOB. Had an US done about 4 days ago ordered by PMD that was negative and referred to the ED for further eval. in ER, CE and EKg neg, CT abd/pelvis and CXR neg. admitted to tele to r/o ACS as high risk of CAD. patient has no GI or cardiac history. colonoscopy 3 yrs ago was normal. never had endoscopy. never smoker. follows with Dr. Roscoe Sandra. [facility] 'for routine". patient is currently asymptomatic    A/P:    EPigastric pain going to chest: possibly GI [ gastritis/GERD/constipaiton] ACS ruled out [ risk DM, HTn]  cardio cleared for DC  c/w proronix BID PO  pain control  laxatives.  see GI in 1-2 weeks.   f/u with Dr. roscoe Sandra in 1 week  diet advised      HTN: c/w home meds    DM: hold metformin for 48 hrs as she got IV dye  ISS, accucheck, DM diet  DC with home meds    DVT-P: lovenox    plan of care discussed with the patient, return precautions discussed. patient and family verbalized understanding    son translated in Guamanian. patient refused . she understands english little bit

## 2019-02-23 DIAGNOSIS — Z98.890 OTHER SPECIFIED POSTPROCEDURAL STATES: Chronic | ICD-10-CM

## 2019-02-24 LAB
HCV AB S/CO SERPL IA: 0.08 S/CO — SIGNIFICANT CHANGE UP (ref 0–0.79)
HCV AB SERPL-IMP: SIGNIFICANT CHANGE UP

## 2019-02-28 ENCOUNTER — APPOINTMENT (OUTPATIENT)
Dept: GASTROENTEROLOGY | Facility: CLINIC | Age: 64
End: 2019-02-28
Payer: COMMERCIAL

## 2019-02-28 VITALS
WEIGHT: 140 LBS | RESPIRATION RATE: 16 BRPM | DIASTOLIC BLOOD PRESSURE: 87 MMHG | HEIGHT: 64 IN | HEART RATE: 93 BPM | OXYGEN SATURATION: 100 % | BODY MASS INDEX: 23.9 KG/M2 | SYSTOLIC BLOOD PRESSURE: 174 MMHG

## 2019-02-28 DIAGNOSIS — Z86.39 PERSONAL HISTORY OF OTHER ENDOCRINE, NUTRITIONAL AND METABOLIC DISEASE: ICD-10-CM

## 2019-02-28 DIAGNOSIS — Z82.3 FAMILY HISTORY OF STROKE: ICD-10-CM

## 2019-02-28 DIAGNOSIS — Z86.79 PERSONAL HISTORY OF OTHER DISEASES OF THE CIRCULATORY SYSTEM: ICD-10-CM

## 2019-02-28 DIAGNOSIS — Z86.69 PERSONAL HISTORY OF OTHER DISEASES OF THE NERVOUS SYSTEM AND SENSE ORGANS: ICD-10-CM

## 2019-02-28 DIAGNOSIS — Z80.9 FAMILY HISTORY OF MALIGNANT NEOPLASM, UNSPECIFIED: ICD-10-CM

## 2019-02-28 PROCEDURE — 99204 OFFICE O/P NEW MOD 45 MIN: CPT

## 2019-02-28 RX ORDER — TIMOLOL MALEATE 5 MG/ML
0.5 SOLUTION OPHTHALMIC
Qty: 10 | Refills: 0 | Status: ACTIVE | COMMUNITY
Start: 2017-09-12

## 2019-02-28 NOTE — ASSESSMENT
[FreeTextEntry1] : Normal probability the patient's symptoms are due to underlying peptic ulcer disease or erosive gastritis. However in view of her age she should be scheduled for an upper endoscopy to be certain there is no underlying gastric neoplasm which may have temporarily responded to acid suppression. She will also undergo repeat CBC, celiac antibodies and liver function tests. Normal probability slight elevation of the AST was due to her atorvastatin. In the meantime she'll continue to take pantoprazole on a daily basis. I will also try to obtain her prior colonoscopies for my review. The risks, benefits, complications and possible adverse consequences associated with upper endoscopy were discussed with the patient.\par

## 2019-02-28 NOTE — PHYSICAL EXAM
[General Appearance - Alert] : alert [General Appearance - In No Acute Distress] : in no acute distress [General Appearance - Well Nourished] : well nourished [General Appearance - Well Developed] : well developed [General Appearance - Well-Appearing] : healthy appearing [Sclera] : the sclera and conjunctiva were normal [PERRL With Normal Accommodation] : pupils were equal in size, round, and reactive to light [Extraocular Movements] : extraocular movements were intact [Outer Ear] : the ears and nose were normal in appearance [Hearing Threshold Finger Rub Not [place]] : hearing was normal [Examination Of The Oral Cavity] : the lips and gums were normal [Oropharynx] : the oropharynx was normal [Neck Appearance] : the appearance of the neck was normal [Auscultation Breath Sounds / Voice Sounds] : lungs were clear to auscultation bilaterally [Heart Rate And Rhythm] : heart rate was normal and rhythm regular [Heart Sounds] : normal S1 and S2 [Heart Sounds Gallop] : no gallops [Murmurs] : no murmurs [Heart Sounds Pericardial Friction Rub] : no pericardial rub [Bowel Sounds] : normal bowel sounds [Abdomen Soft] : soft [Abdomen Tenderness] : non-tender [Abdomen Mass (___ Cm)] : no abdominal mass palpated [No CVA Tenderness] : no ~M costovertebral angle tenderness [No Spinal Tenderness] : no spinal tenderness [Abnormal Walk] : normal gait [Nail Clubbing] : no clubbing  or cyanosis of the fingernails [Musculoskeletal - Swelling] : no joint swelling seen [Motor Tone] : muscle strength and tone were normal [Skin Color & Pigmentation] : normal skin color and pigmentation [Skin Turgor] : normal skin turgor [] : no rash [Motor Exam] : the motor exam was normal [No Focal Deficits] : no focal deficits [Oriented To Time, Place, And Person] : oriented to person, place, and time [Impaired Insight] : insight and judgment were intact [Affect] : the affect was normal

## 2019-02-28 NOTE — HISTORY OF PRESENT ILLNESS
[de-identified] : About one month ago she began to experience episodes of rather diffuse upper abdominal pain which he is unable to characterize. The pain did not occur every day and one present could last throughout the day. There was no radiation of the pain is not affected by the ingestion of food. There were no particular inciting or alleviating factors. The pain would awaken her from sleep. During this time she would take 2 Aleve on a daily basis for this pain. She did not take any Aleve prior to the onset of the pain. On February 20 she was admitted to the hospital for further evaluation at which time a CBC and complete metabolic profile was normal other than a minimal elevation of the AST. A CAT scan of the abdomen and pelvis with contrast was unremarkable. She was seen by cardiology to exclude a cardiac etiology and was discharged in less than 2 days after admission. She was discharged on pantoprazole 40 mg p.o. q.a.m. and has been symptom-free ever since. There is no nausea or vomiting. Her appetite and weight are stable. There is no diarrhea or constipation. She denies any rectal bleeding or melena. He apparently underwent a screening colonoscopy 3 years ago by Dr. Carolina which was unremarkable and he had performed an unremarkable colonoscopy 5 years prior to that exam although no reports are currently available for my review. She is a [employer].

## 2019-03-01 PROBLEM — E11.9 TYPE 2 DIABETES MELLITUS WITHOUT COMPLICATIONS: Chronic | Status: ACTIVE | Noted: 2019-02-23

## 2019-03-01 PROBLEM — E78.5 HYPERLIPIDEMIA, UNSPECIFIED: Chronic | Status: ACTIVE | Noted: 2019-02-23

## 2019-03-01 PROBLEM — I10 ESSENTIAL (PRIMARY) HYPERTENSION: Chronic | Status: ACTIVE | Noted: 2019-02-23

## 2019-03-29 LAB
ALBUMIN SERPL ELPH-MCNC: 4.5 G/DL
ALP BLD-CCNC: 71 U/L
ALT SERPL-CCNC: 43 U/L
ANION GAP SERPL CALC-SCNC: 14 MMOL/L
AST SERPL-CCNC: 27 U/L
BASOPHILS # BLD AUTO: 0.03 K/UL
BASOPHILS NFR BLD AUTO: 0.5 %
BILIRUB SERPL-MCNC: 0.3 MG/DL
BUN SERPL-MCNC: 18 MG/DL
CALCIUM SERPL-MCNC: 10 MG/DL
CHLORIDE SERPL-SCNC: 102 MMOL/L
CO2 SERPL-SCNC: 24 MMOL/L
CREAT SERPL-MCNC: 0.92 MG/DL
EOSINOPHIL # BLD AUTO: 0.25 K/UL
EOSINOPHIL NFR BLD AUTO: 4.5 %
GLUCOSE SERPL-MCNC: 119 MG/DL
HCT VFR BLD CALC: 39.6 %
HGB BLD-MCNC: 12.9 G/DL
IGA SER QL IEP: 173 MG/DL
IMM GRANULOCYTES NFR BLD AUTO: 0.5 %
INR PPP: 1.01 RATIO
LYMPHOCYTES # BLD AUTO: 1.32 K/UL
LYMPHOCYTES NFR BLD AUTO: 23.7 %
MAN DIFF?: NORMAL
MCHC RBC-ENTMCNC: 30.1 PG
MCHC RBC-ENTMCNC: 32.6 GM/DL
MCV RBC AUTO: 92.3 FL
MONOCYTES # BLD AUTO: 0.34 K/UL
MONOCYTES NFR BLD AUTO: 6.1 %
NEUTROPHILS # BLD AUTO: 3.6 K/UL
NEUTROPHILS NFR BLD AUTO: 64.7 %
PLATELET # BLD AUTO: 221 K/UL
POTASSIUM SERPL-SCNC: 4.4 MMOL/L
PROT SERPL-MCNC: 6.8 G/DL
PT BLD: 11.6 SEC
RBC # BLD: 4.29 M/UL
RBC # FLD: 12.9 %
SODIUM SERPL-SCNC: 140 MMOL/L
WBC # FLD AUTO: 5.57 K/UL

## 2019-04-03 LAB
ENDOMYSIUM IGA SER QL: NEGATIVE
ENDOMYSIUM IGA TITR SER: NORMAL

## 2019-04-09 ENCOUNTER — APPOINTMENT (OUTPATIENT)
Dept: GASTROENTEROLOGY | Facility: GI CENTER | Age: 64
End: 2019-04-09
Payer: COMMERCIAL

## 2019-04-09 ENCOUNTER — RESULT REVIEW (OUTPATIENT)
Age: 64
End: 2019-04-09

## 2019-04-09 ENCOUNTER — OUTPATIENT (OUTPATIENT)
Dept: OUTPATIENT SERVICES | Facility: HOSPITAL | Age: 64
LOS: 1 days | End: 2019-04-09
Payer: COMMERCIAL

## 2019-04-09 DIAGNOSIS — Z98.890 OTHER SPECIFIED POSTPROCEDURAL STATES: Chronic | ICD-10-CM

## 2019-04-09 DIAGNOSIS — R10.10 UPPER ABDOMINAL PAIN, UNSPECIFIED: ICD-10-CM

## 2019-04-09 LAB — GLUCOSE BLDC GLUCOMTR-MCNC: 117 MG/DL — HIGH (ref 70–99)

## 2019-04-09 PROCEDURE — 88342 IMHCHEM/IMCYTCHM 1ST ANTB: CPT

## 2019-04-09 PROCEDURE — 88305 TISSUE EXAM BY PATHOLOGIST: CPT

## 2019-04-09 PROCEDURE — 43239 EGD BIOPSY SINGLE/MULTIPLE: CPT

## 2019-04-09 PROCEDURE — 88305 TISSUE EXAM BY PATHOLOGIST: CPT | Mod: 26

## 2019-04-09 PROCEDURE — 82962 GLUCOSE BLOOD TEST: CPT

## 2019-04-09 PROCEDURE — 88342 IMHCHEM/IMCYTCHM 1ST ANTB: CPT | Mod: 26

## 2019-04-09 NOTE — PHYSICAL EXAM
[General Appearance - Alert] : alert [General Appearance - In No Acute Distress] : in no acute distress [General Appearance - Well Developed] : well developed [General Appearance - Well Nourished] : well nourished [General Appearance - Well-Appearing] : healthy appearing [Sclera] : the sclera and conjunctiva were normal [Extraocular Movements] : extraocular movements were intact [PERRL With Normal Accommodation] : pupils were equal in size, round, and reactive to light [Outer Ear] : the ears and nose were normal in appearance [Oropharynx] : the oropharynx was normal [Examination Of The Oral Cavity] : the lips and gums were normal [Hearing Threshold Finger Rub Not [place]] : hearing was normal [Auscultation Breath Sounds / Voice Sounds] : lungs were clear to auscultation bilaterally [Neck Appearance] : the appearance of the neck was normal [Heart Sounds] : normal S1 and S2 [Heart Sounds Gallop] : no gallops [Heart Rate And Rhythm] : heart rate was normal and rhythm regular [Murmurs] : no murmurs [Heart Sounds Pericardial Friction Rub] : no pericardial rub [Bowel Sounds] : normal bowel sounds [Abdomen Tenderness] : non-tender [Abdomen Soft] : soft [Abdomen Mass (___ Cm)] : no abdominal mass palpated [No CVA Tenderness] : no ~M costovertebral angle tenderness [Abnormal Walk] : normal gait [No Spinal Tenderness] : no spinal tenderness [Musculoskeletal - Swelling] : no joint swelling seen [Nail Clubbing] : no clubbing  or cyanosis of the fingernails [Motor Tone] : muscle strength and tone were normal [Skin Turgor] : normal skin turgor [Skin Color & Pigmentation] : normal skin color and pigmentation [Motor Exam] : the motor exam was normal [] : no rash [No Focal Deficits] : no focal deficits [Oriented To Time, Place, And Person] : oriented to person, place, and time [Affect] : the affect was normal [Impaired Insight] : insight and judgment were intact

## 2019-04-09 NOTE — PHYSICAL EXAM
[General Appearance - Alert] : alert [General Appearance - Well Developed] : well developed [General Appearance - In No Acute Distress] : in no acute distress [General Appearance - Well Nourished] : well nourished [General Appearance - Well-Appearing] : healthy appearing [Sclera] : the sclera and conjunctiva were normal [PERRL With Normal Accommodation] : pupils were equal in size, round, and reactive to light [Extraocular Movements] : extraocular movements were intact [Outer Ear] : the ears and nose were normal in appearance [Examination Of The Oral Cavity] : the lips and gums were normal [Oropharynx] : the oropharynx was normal [Hearing Threshold Finger Rub Not [place]] : hearing was normal [Auscultation Breath Sounds / Voice Sounds] : lungs were clear to auscultation bilaterally [Neck Appearance] : the appearance of the neck was normal [Heart Rate And Rhythm] : heart rate was normal and rhythm regular [Heart Sounds] : normal S1 and S2 [Heart Sounds Gallop] : no gallops [Murmurs] : no murmurs [Bowel Sounds] : normal bowel sounds [Heart Sounds Pericardial Friction Rub] : no pericardial rub [Abdomen Soft] : soft [Abdomen Tenderness] : non-tender [Abdomen Mass (___ Cm)] : no abdominal mass palpated [No CVA Tenderness] : no ~M costovertebral angle tenderness [Abnormal Walk] : normal gait [No Spinal Tenderness] : no spinal tenderness [Musculoskeletal - Swelling] : no joint swelling seen [Nail Clubbing] : no clubbing  or cyanosis of the fingernails [Motor Tone] : muscle strength and tone were normal [Skin Turgor] : normal skin turgor [Skin Color & Pigmentation] : normal skin color and pigmentation [] : no rash [Motor Exam] : the motor exam was normal [Oriented To Time, Place, And Person] : oriented to person, place, and time [No Focal Deficits] : no focal deficits [Affect] : the affect was normal [Impaired Insight] : insight and judgment were intact

## 2019-04-09 NOTE — PROCEDURE
[With Biopsy] : with biopsy [Epigastric Pain] : epigastric pain [Procedure Explained] : The procedure was explained [Allergies Reviewed] : allergies reviewed. [Risks] : Risks [Benefits] : benefits [Alternatives] : alternatives [Consent Obtained] : written consent was obtained prior to the procedure and is detailed in the patient's record [Patient] : the patient [Automated Blood Pressure Cuff] : automated blood pressure cuff [Cardiac Monitor] : cardiac monitor [Pulse Oximeter] : pulse oximeter [Versed ___ mg IV] : Versed [unfilled] ~Umg intravenously [Propofol ___ mg IV] : Propofol [unfilled] ~Umg intravenously [2] : 2 [Erythema] : erythema [Erosion] : erosion [Normal] : Normal [Sent to Pathology] : was sent to pathology for analysis [Tolerated Well] : the patient tolerated the procedure well [Vital Signs Stable] : the vital signs were stable [de-identified] : biopsy obtained [de-identified] : difuse erythema and slight nodularity-several biopsies obtained [de-identified] : with an area that had the appearance of a site of a prior ulcer with healing and reepithealization [de-identified] : She will continue [id number] PO qAM

## 2019-04-09 NOTE — PROCEDURE
[With Biopsy] : with biopsy [Epigastric Pain] : epigastric pain [Procedure Explained] : The procedure was explained [Allergies Reviewed] : allergies reviewed. [Risks] : Risks [Benefits] : benefits [Alternatives] : alternatives [Consent Obtained] : written consent was obtained prior to the procedure and is detailed in the patient's record [Patient] : the patient [Automated Blood Pressure Cuff] : automated blood pressure cuff [Cardiac Monitor] : cardiac monitor [Pulse Oximeter] : pulse oximeter [Versed ___ mg IV] : Versed [unfilled] ~Umg intravenously [Propofol ___ mg IV] : Propofol [unfilled] ~Umg intravenously [2] : 2 [Erythema] : erythema [Erosion] : erosion [Normal] : Normal [Sent to Pathology] : was sent to pathology for analysis [Tolerated Well] : the patient tolerated the procedure well [Vital Signs Stable] : the vital signs were stable [de-identified] : biopsy obtained [de-identified] : difuse erythema and slight nodularity-several biopsies obtained [de-identified] : with an area that had the appearance of a site of a prior ulcer with healing and reepithealization [de-identified] : She will continue [id number] PO qAM

## 2019-04-11 LAB
GLIADIN IGA SER QL: 5.3 UNITS
GLIADIN IGG SER QL: <5 UNITS
GLIADIN PEPTIDE IGA SER-ACNC: NEGATIVE
GLIADIN PEPTIDE IGG SER-ACNC: NEGATIVE
SURGICAL PATHOLOGY STUDY: SIGNIFICANT CHANGE UP

## 2019-04-12 LAB
TTG IGA SER IA-ACNC: 1.3 U/ML
TTG IGA SER-ACNC: NEGATIVE
TTG IGG SER IA-ACNC: 1.2 U/ML
TTG IGG SER IA-ACNC: NEGATIVE

## 2019-05-09 ENCOUNTER — APPOINTMENT (OUTPATIENT)
Dept: GASTROENTEROLOGY | Facility: CLINIC | Age: 64
End: 2019-05-09
Payer: COMMERCIAL

## 2019-05-09 VITALS
OXYGEN SATURATION: 98 % | WEIGHT: 140 LBS | HEART RATE: 82 BPM | BODY MASS INDEX: 23.9 KG/M2 | HEIGHT: 64 IN | DIASTOLIC BLOOD PRESSURE: 70 MMHG | RESPIRATION RATE: 15 BRPM | SYSTOLIC BLOOD PRESSURE: 140 MMHG

## 2019-05-09 DIAGNOSIS — Z87.19 PERSONAL HISTORY OF OTHER DISEASES OF THE DIGESTIVE SYSTEM: ICD-10-CM

## 2019-05-09 PROCEDURE — 99214 OFFICE O/P EST MOD 30 MIN: CPT

## 2019-05-09 NOTE — HISTORY OF PRESENT ILLNESS
[de-identified] : Since her last visit she continues to be pain free on pantoprazole 40 mg p.o. q.a.m. She continues to refrain from using any further NSAIDs as well. The upper endoscopy revealed some erythema in the gastric body as well as erythema and erosion in the antrum as well as in the duodenal bulb. There was also an area in the duodenal bulb it had the appearance as though there may have been a prior ulcer with reepithelialization. Biopsies were positive for H. pylori. At this time she is completely symptom free without any abdominal pain, nausea, vomiting, diarrhea or constipation.

## 2019-05-09 NOTE — PHYSICAL EXAM
[General Appearance - Alert] : alert [General Appearance - In No Acute Distress] : in no acute distress [General Appearance - Well Nourished] : well nourished [General Appearance - Well Developed] : well developed [General Appearance - Well-Appearing] : healthy appearing [Sclera] : the sclera and conjunctiva were normal [PERRL With Normal Accommodation] : pupils were equal in size, round, and reactive to light [Extraocular Movements] : extraocular movements were intact [Outer Ear] : the ears and nose were normal in appearance [Hearing Threshold Finger Rub Not [place]] : hearing was normal [Examination Of The Oral Cavity] : the lips and gums were normal [Oropharynx] : the oropharynx was normal [Neck Appearance] : the appearance of the neck was normal [Heart Rate And Rhythm] : heart rate was normal and rhythm regular [Abdomen Tenderness] : non-tender [Bowel Sounds] : normal bowel sounds [Abdomen Soft] : soft [Abdomen Mass (___ Cm)] : no abdominal mass palpated [No CVA Tenderness] : no ~M costovertebral angle tenderness [No Spinal Tenderness] : no spinal tenderness [Abnormal Walk] : normal gait [Nail Clubbing] : no clubbing  or cyanosis of the fingernails [Musculoskeletal - Swelling] : no joint swelling seen [Motor Tone] : muscle strength and tone were normal [Skin Color & Pigmentation] : normal skin color and pigmentation [Skin Turgor] : normal skin turgor [] : no rash [Motor Exam] : the motor exam was normal [No Focal Deficits] : no focal deficits [Oriented To Time, Place, And Person] : oriented to person, place, and time [Impaired Insight] : insight and judgment were intact [Affect] : the affect was normal

## 2019-05-09 NOTE — ASSESSMENT
[FreeTextEntry1] : At this time I have recommended that she proceed with therapy with CH pylori with a two-week course of amoxicillin, erythromycin, Flagyl and omeprazole twice daily. During that period of time she'll stop the pantoprazole which she will resume after the 2 weeks of therapy. After completion of therapy she will submit a stool for H. pylori antigen. She'll be seeing him in 2 months for further followup. She will be due for followup colonoscopy in 2 years.

## 2019-06-17 LAB — H PYLORI AG STL QL: NOT DETECTED

## 2019-06-20 ENCOUNTER — APPOINTMENT (OUTPATIENT)
Dept: GASTROENTEROLOGY | Facility: CLINIC | Age: 64
End: 2019-06-20
Payer: COMMERCIAL

## 2019-06-20 VITALS
BODY MASS INDEX: 24.24 KG/M2 | SYSTOLIC BLOOD PRESSURE: 138 MMHG | HEART RATE: 84 BPM | OXYGEN SATURATION: 98 % | WEIGHT: 142 LBS | RESPIRATION RATE: 16 BRPM | HEIGHT: 64 IN | DIASTOLIC BLOOD PRESSURE: 72 MMHG

## 2019-06-20 PROCEDURE — 99214 OFFICE O/P EST MOD 30 MIN: CPT

## 2019-06-20 RX ORDER — CLARITHROMYCIN 500 MG/1
500 TABLET, FILM COATED ORAL TWICE DAILY
Qty: 28 | Refills: 0 | Status: DISCONTINUED | COMMUNITY
Start: 2019-05-09 | End: 2019-06-20

## 2019-06-20 RX ORDER — METRONIDAZOLE 500 MG/1
500 TABLET ORAL
Qty: 28 | Refills: 0 | Status: DISCONTINUED | COMMUNITY
Start: 2019-05-09 | End: 2019-06-20

## 2019-06-20 RX ORDER — AMOXICILLIN 500 MG/1
500 TABLET, FILM COATED ORAL TWICE DAILY
Qty: 56 | Refills: 0 | Status: DISCONTINUED | COMMUNITY
Start: 2019-05-09 | End: 2019-06-20

## 2019-06-20 NOTE — PHYSICAL EXAM
[General Appearance - Alert] : alert [General Appearance - In No Acute Distress] : in no acute distress [General Appearance - Well Developed] : well developed [General Appearance - Well Nourished] : well nourished [Sclera] : the sclera and conjunctiva were normal [General Appearance - Well-Appearing] : healthy appearing [Extraocular Movements] : extraocular movements were intact [PERRL With Normal Accommodation] : pupils were equal in size, round, and reactive to light [Hearing Threshold Finger Rub Not [place]] : hearing was normal [Examination Of The Oral Cavity] : the lips and gums were normal [Outer Ear] : the ears and nose were normal in appearance [Oropharynx] : the oropharynx was normal [Neck Appearance] : the appearance of the neck was normal [Bowel Sounds] : normal bowel sounds [Heart Rate And Rhythm] : heart rate was normal and rhythm regular [Abdomen Soft] : soft [Abdomen Mass (___ Cm)] : no abdominal mass palpated [Abdomen Tenderness] : non-tender [No CVA Tenderness] : no ~M costovertebral angle tenderness [No Spinal Tenderness] : no spinal tenderness [Nail Clubbing] : no clubbing  or cyanosis of the fingernails [Abnormal Walk] : normal gait [Motor Tone] : muscle strength and tone were normal [Musculoskeletal - Swelling] : no joint swelling seen [Skin Turgor] : normal skin turgor [] : no rash [Skin Color & Pigmentation] : normal skin color and pigmentation [Motor Exam] : the motor exam was normal [No Focal Deficits] : no focal deficits [Affect] : the affect was normal [Impaired Insight] : insight and judgment were intact [Oriented To Time, Place, And Person] : oriented to person, place, and time

## 2019-06-20 NOTE — ASSESSMENT
[FreeTextEntry1] : Normal probability she has had prior peptic ulcer disease which had healed by the time she underwent upper endoscopy which otherwise revealed a significant antral gastritis and duodenitis. I recommended that her current supply of pantoprazole as depleted that she switch to omeprazole 20 mg p.o. q.a.m. I also recommended that he discontinue the senna and uses Benefiber one heaping tablespoon in 6-8 ounces of fluid daily and that she take MiraLax in the evening as needed. She'll obtain the report of her last colonoscopy for my review and will be seen again in 3 months for further followup.

## 2019-06-20 NOTE — HISTORY OF PRESENT ILLNESS
[de-identified] : Since her last visit she submitted a stool for H. pylori antigen which is negative. She continues to take pantoprazole and is free of any dyspeptic symptoms or abdominal pain. She has chronic constipation which is not taking senna rather frequently. There is no family history of colon cancer and she has undergone at least 2 prior colonoscopies by Dr. Carolina in [facility] last of which was 3 or 4 years ago was apparently normal. There is no rectal bleeding or melena. Her appetite and weight are stable.

## 2019-07-10 PROCEDURE — 84484 ASSAY OF TROPONIN QUANT: CPT

## 2019-07-10 PROCEDURE — 83880 ASSAY OF NATRIURETIC PEPTIDE: CPT

## 2019-07-10 PROCEDURE — 85610 PROTHROMBIN TIME: CPT

## 2019-07-10 PROCEDURE — 71045 X-RAY EXAM CHEST 1 VIEW: CPT

## 2019-07-10 PROCEDURE — 36415 COLL VENOUS BLD VENIPUNCTURE: CPT

## 2019-07-10 PROCEDURE — 82550 ASSAY OF CK (CPK): CPT

## 2019-07-10 PROCEDURE — 85027 COMPLETE CBC AUTOMATED: CPT

## 2019-07-10 PROCEDURE — 80061 LIPID PANEL: CPT

## 2019-07-10 PROCEDURE — 74177 CT ABD & PELVIS W/CONTRAST: CPT

## 2019-07-10 PROCEDURE — 99285 EMERGENCY DEPT VISIT HI MDM: CPT | Mod: 25

## 2019-07-10 PROCEDURE — 96374 THER/PROPH/DIAG INJ IV PUSH: CPT | Mod: XU

## 2019-07-10 PROCEDURE — 86803 HEPATITIS C AB TEST: CPT

## 2019-07-10 PROCEDURE — 93005 ELECTROCARDIOGRAM TRACING: CPT

## 2019-07-10 PROCEDURE — 96375 TX/PRO/DX INJ NEW DRUG ADDON: CPT

## 2019-07-10 PROCEDURE — 83036 HEMOGLOBIN GLYCOSYLATED A1C: CPT

## 2019-07-10 PROCEDURE — 82962 GLUCOSE BLOOD TEST: CPT

## 2019-07-10 PROCEDURE — 80053 COMPREHEN METABOLIC PANEL: CPT

## 2019-07-10 PROCEDURE — G0378: CPT

## 2019-07-10 PROCEDURE — 84443 ASSAY THYROID STIM HORMONE: CPT

## 2019-07-10 PROCEDURE — 83735 ASSAY OF MAGNESIUM: CPT

## 2019-07-10 PROCEDURE — 85730 THROMBOPLASTIN TIME PARTIAL: CPT

## 2019-07-10 PROCEDURE — 83690 ASSAY OF LIPASE: CPT

## 2019-07-19 ENCOUNTER — APPOINTMENT (OUTPATIENT)
Dept: GASTROENTEROLOGY | Facility: CLINIC | Age: 64
End: 2019-07-19

## 2019-12-12 ENCOUNTER — OUTPATIENT (OUTPATIENT)
Dept: OUTPATIENT SERVICES | Facility: HOSPITAL | Age: 64
LOS: 1 days | End: 2019-12-12
Payer: MEDICAID

## 2019-12-12 ENCOUNTER — APPOINTMENT (OUTPATIENT)
Dept: MAMMOGRAPHY | Facility: CLINIC | Age: 64
End: 2019-12-12
Payer: MEDICAID

## 2019-12-12 ENCOUNTER — APPOINTMENT (OUTPATIENT)
Dept: RADIOLOGY | Facility: CLINIC | Age: 64
End: 2019-12-12
Payer: MEDICAID

## 2019-12-12 DIAGNOSIS — Z12.31 ENCOUNTER FOR SCREENING MAMMOGRAM FOR MALIGNANT NEOPLASM OF BREAST: ICD-10-CM

## 2019-12-12 DIAGNOSIS — Z98.890 OTHER SPECIFIED POSTPROCEDURAL STATES: Chronic | ICD-10-CM

## 2019-12-12 DIAGNOSIS — Z13.820 ENCOUNTER FOR SCREENING FOR OSTEOPOROSIS: ICD-10-CM

## 2019-12-12 PROCEDURE — 77080 DXA BONE DENSITY AXIAL: CPT | Mod: 26

## 2019-12-12 PROCEDURE — 77067 SCR MAMMO BI INCL CAD: CPT | Mod: 26

## 2019-12-12 PROCEDURE — 77067 SCR MAMMO BI INCL CAD: CPT

## 2019-12-12 PROCEDURE — 77063 BREAST TOMOSYNTHESIS BI: CPT

## 2019-12-12 PROCEDURE — 77080 DXA BONE DENSITY AXIAL: CPT

## 2019-12-12 PROCEDURE — 77063 BREAST TOMOSYNTHESIS BI: CPT | Mod: 26

## 2020-04-29 ENCOUNTER — APPOINTMENT (OUTPATIENT)
Dept: GASTROENTEROLOGY | Facility: CLINIC | Age: 65
End: 2020-04-29
Payer: MEDICAID

## 2020-04-29 DIAGNOSIS — A04.8 OTHER SPECIFIED BACTERIAL INTESTINAL INFECTIONS: ICD-10-CM

## 2020-04-29 DIAGNOSIS — K29.60 OTHER GASTRITIS W/OUT BLEEDING: ICD-10-CM

## 2020-04-29 DIAGNOSIS — R10.10 UPPER ABDOMINAL PAIN, UNSPECIFIED: ICD-10-CM

## 2020-04-29 DIAGNOSIS — K59.09 OTHER CONSTIPATION: ICD-10-CM

## 2020-04-29 PROCEDURE — 99213 OFFICE O/P EST LOW 20 MIN: CPT | Mod: 95

## 2020-04-29 NOTE — HISTORY OF PRESENT ILLNESS
[Home] : at home, [unfilled] , at the time of the visit. [Medical Office: ([facility])___] : at the medical office located in  [Patient] : the patient [Self] : self [___ Month(s) Ago] : [unfilled] month(s) ago [None] : had no significant interval events [de-identified] : Sometime after her last visit in June of 2019 she stopped taking all antacid medications and continues to be free of any upper abdominal pain or dyspeptic symptoms that resolved after having been treated for H. pylori infection. She continues to be constipated with a firm bowel movement every 2-3 days and has not been taking senna recommended Benefiber and MiraLax. There is no abdominal pain anywhere else in her abdomen and she denies any rectal bleeding or melena. Her appetite and weight are stable. She has not developed any new medical problems since her last visit. I was obtain results of her colonoscopy from February of 2016 by Dr. Carolina is performed for screening purposes and was normal other than the presence of hemorrhoids. He recommended a followup colonoscopy in 10 years.

## 2020-04-29 NOTE — ASSESSMENT
[FreeTextEntry1] : At this time she is doing well and I simply recommended that she take Benefiber one heaping tablespoon in 6-8 ounces of fluid daily and MiraLax every evening. She should make a followup appointment with me in the fall of this year.

## 2021-01-18 ENCOUNTER — APPOINTMENT (OUTPATIENT)
Dept: MAMMOGRAPHY | Facility: CLINIC | Age: 66
End: 2021-01-18
Payer: MEDICAID

## 2021-01-18 ENCOUNTER — OUTPATIENT (OUTPATIENT)
Dept: OUTPATIENT SERVICES | Facility: HOSPITAL | Age: 66
LOS: 1 days | End: 2021-01-18
Payer: MEDICAID

## 2021-01-18 DIAGNOSIS — R92.8 OTHER ABNORMAL AND INCONCLUSIVE FINDINGS ON DIAGNOSTIC IMAGING OF BREAST: ICD-10-CM

## 2021-01-18 DIAGNOSIS — Z98.890 OTHER SPECIFIED POSTPROCEDURAL STATES: Chronic | ICD-10-CM

## 2021-01-18 DIAGNOSIS — Z00.8 ENCOUNTER FOR OTHER GENERAL EXAMINATION: ICD-10-CM

## 2021-01-18 PROCEDURE — 77063 BREAST TOMOSYNTHESIS BI: CPT

## 2021-01-18 PROCEDURE — 77067 SCR MAMMO BI INCL CAD: CPT | Mod: 26

## 2021-01-18 PROCEDURE — 77063 BREAST TOMOSYNTHESIS BI: CPT | Mod: 26

## 2021-01-18 PROCEDURE — 77067 SCR MAMMO BI INCL CAD: CPT

## 2021-10-19 RX ORDER — OMEPRAZOLE 20 MG/1
20 CAPSULE, DELAYED RELEASE ORAL
Qty: 28 | Refills: 0 | Status: DISCONTINUED | COMMUNITY
Start: 2019-05-09 | End: 2021-10-19

## 2021-10-19 RX ORDER — PANTOPRAZOLE 40 MG/1
40 TABLET, DELAYED RELEASE ORAL DAILY
Qty: 30 | Refills: 5 | Status: DISCONTINUED | COMMUNITY
Start: 2019-02-28 | End: 2021-10-19

## 2021-10-19 RX ORDER — METFORMIN HYDROCHLORIDE 850 MG/1
850 TABLET, COATED ORAL
Qty: 60 | Refills: 0 | Status: DISCONTINUED | COMMUNITY
Start: 2018-07-25 | End: 2021-10-19

## 2021-10-20 ENCOUNTER — APPOINTMENT (OUTPATIENT)
Dept: ENDOCRINOLOGY | Facility: CLINIC | Age: 66
End: 2021-10-20
Payer: MEDICAID

## 2021-10-20 VITALS
HEIGHT: 64 IN | SYSTOLIC BLOOD PRESSURE: 120 MMHG | WEIGHT: 136 LBS | BODY MASS INDEX: 23.22 KG/M2 | DIASTOLIC BLOOD PRESSURE: 70 MMHG

## 2021-10-20 LAB — GLUCOSE BLDC GLUCOMTR-MCNC: 123

## 2021-10-20 PROCEDURE — 82962 GLUCOSE BLOOD TEST: CPT

## 2021-10-20 PROCEDURE — 99204 OFFICE O/P NEW MOD 45 MIN: CPT | Mod: 25

## 2021-10-20 RX ORDER — DOCUSATE SODIUM AND SENNOSIDES 50; 8.6 MG/1; MG/1
8.6-5 TABLET, FILM COATED ORAL
Refills: 0 | Status: DISCONTINUED | COMMUNITY
End: 2021-10-20

## 2021-10-20 RX ORDER — FENOFIBRATE 160 MG/1
160 TABLET ORAL
Qty: 30 | Refills: 0 | Status: DISCONTINUED | COMMUNITY
Start: 2018-08-27 | End: 2021-10-20

## 2021-10-20 RX ORDER — BLOOD SUGAR DIAGNOSTIC
STRIP MISCELLANEOUS
Qty: 100 | Refills: 0 | Status: DISCONTINUED | COMMUNITY
Start: 2019-02-15 | End: 2021-10-20

## 2021-10-20 RX ORDER — OMEPRAZOLE 20 MG/1
20 CAPSULE, DELAYED RELEASE ORAL
Qty: 60 | Refills: 3 | Status: DISCONTINUED | COMMUNITY
Start: 2019-06-20 | End: 2021-10-20

## 2021-10-20 RX ORDER — NAPROXEN 500 MG/1
500 TABLET ORAL
Qty: 60 | Refills: 0 | Status: DISCONTINUED | COMMUNITY
Start: 2019-02-15 | End: 2021-10-20

## 2021-11-03 RX ORDER — BLOOD SUGAR DIAGNOSTIC
STRIP MISCELLANEOUS
Qty: 100 | Refills: 1 | Status: ACTIVE | COMMUNITY
Start: 2021-11-03 | End: 1900-01-01

## 2021-11-05 NOTE — PHYSICAL EXAM
[Alert] : alert [Well Nourished] : well nourished [No Acute Distress] : no acute distress [Well Developed] : well developed [Normal Sclera/Conjunctiva] : normal sclera/conjunctiva [EOMI] : extra ocular movement intact [No Proptosis] : no proptosis [Normal Oropharynx] : the oropharynx was normal [Thyroid Not Enlarged] : the thyroid was not enlarged [No Thyroid Nodules] : no palpable thyroid nodules [No Respiratory Distress] : no respiratory distress [No Accessory Muscle Use] : no accessory muscle use [Clear to Auscultation] : lungs were clear to auscultation bilaterally [Normal S1, S2] : normal S1 and S2 [Normal Rate] : heart rate was normal [Regular Rhythm] : with a regular rhythm [No Edema] : no peripheral edema [Pedal Pulses Normal] : the pedal pulses are present [Normal Anterior Cervical Nodes] : no anterior cervical lymphadenopathy [Normal Posterior Cervical Nodes] : no posterior cervical lymphadenopathy [No Spinal Tenderness] : no spinal tenderness [Spine Straight] : spine straight [No Stigmata of Cushings Syndrome] : no stigmata of [place]ushings Syndrome [Normal Gait] : normal gait [Normal Strength/Tone] : muscle strength and tone were normal [No Rash] : no rash [Acanthosis Nigricans] : no acanthosis nigricans [Normal] : normal [Full ROM] : with full range of motion [Diminished Throughout Both Feet] : normal tactile sensation with monofilament testing throughout both feet [Normal Reflexes] : deep tendon reflexes were 2+ and symmetric [No Tremors] : no tremors [Oriented x3] : oriented to person, place, and time

## 2021-11-05 NOTE — ASSESSMENT
[FreeTextEntry1] : Type 2 diabetes\par Need to determine controlled\par Test blood sugars twice daily AC\par Check updated labs\par \par Meet with RD CDE\par \par Hypertension seems controlled start daily\par \par \par High cholesterol\par Check updated labs\par

## 2021-11-05 NOTE — HISTORY OF PRESENT ILLNESS
[FreeTextEntry1] : Patient here for evaluation of type 2 diabetes for 10 years\par Does not think she has any kidney damage eye damage or nerve damage\par \par Does have glaucoma\par \par Patient had noticed blood sugars were increasing especially after eating foods like pasta\par \par In past saw RD CDE 1 time\par Exercise eighties is limited, is not always so exercising\par \par Past medical history hypertension\par Kidney stones\par Fatty liver\par Glaucoma\par \par \par Past surgical history\par Bladder lift, which has now failed, needs to see urology\par \par Family history Father\par  from bone cancer\par Mother CVA\par Brother hypertension\par \par Children with thyroid trouble\par \par Diet is breakfast yogurt–with strawberries or blueberries, cottage cheese\par Lunch chicken salad green tea dinner pasta 1 time a week\par Current meds gemfibrozil 600 mg twice daily\par Amlodipine 5 mg once daily\par Simvastatin 10 mg once per day\par Metformin 500 mg 1 tablet twice daily\par Aspirin 81 mg\par B12\par Vitamin C\par Calcium\par Cranberry\par Omega-3 fish oils

## 2022-01-04 LAB
HBA1C MFR BLD HPLC: 6.2
LDLC SERPL DIRECT ASSAY-MCNC: 96
MICROALBUMIN/CREAT 24H UR-RTO: 1356

## 2022-01-05 ENCOUNTER — NON-APPOINTMENT (OUTPATIENT)
Age: 67
End: 2022-01-05

## 2022-01-05 ENCOUNTER — APPOINTMENT (OUTPATIENT)
Dept: ENDOCRINOLOGY | Facility: CLINIC | Age: 67
End: 2022-01-05

## 2022-01-06 ENCOUNTER — APPOINTMENT (OUTPATIENT)
Dept: CARDIOLOGY | Facility: CLINIC | Age: 67
End: 2022-01-06

## 2022-09-06 NOTE — ED STATDOCS - CHPI ED SYMPTOM NEG
-- DO NOT REPLY / DO NOT REPLY ALL --  -- Message is from Engagement Center Operations (ECO) --    ONLY TO BE USED WITHIN A REFILL MEDICATION ENCOUNTER    Med Refill  Is the patient currently having Emergent symptoms?: No    Has patient contacted the pharmacy? Yes    Is this the first request for the medication in the last 48 hours?: Yes    Patient is requesting a medication refill - medication is on active medication list    Patient is currently OUT of the requested medication - sent as HIGH priority      Full name of the provider who ordered the medication: [facility]yo [facility] site name / Account # for provider:[facility] - [address] [place] [address]      Preferred Pharmacy: Pharmacy  [id number] [address]    Patient confirmed the above pharmacy as correct?  Yes      Caller Information       Type Contact Phone/Fax    09/06/2022 05:00 PM CDT Phone (Incoming) ROSEANNA SUERO (Emergency Contact) [phone]          Alternative phone number: None    Can a detailed message be left?: Yes    Patient is completely out of medication: verify if patient is currently experiencing symptoms. If patient is symptomatic, proceed with front end triage instead of medication refill. If patient is not symptomatic but is completely out of medication, carine as High priority when routing. Inform patient: “Please call back with any questions or concerns and if your condition becomes life threatening, you should seek immediate medical assistance by calling 911 or going to the Emergency Department for evaluation.”    Inform all patients: \"Please be aware the return phone call may come from an unidentified phone number and your refill request will be addressed as soon as the clinical team reviews your message.\"   no diarrhea/no vomiting/no dysuria/no fever/no burning urination/no nausea/no hematuria

## 2022-12-30 ENCOUNTER — RX RENEWAL (OUTPATIENT)
Age: 67
End: 2022-12-30

## 2023-01-23 ENCOUNTER — OFFICE (OUTPATIENT)
Dept: URBAN - METROPOLITAN AREA CLINIC 100 | Facility: CLINIC | Age: 68
Setting detail: OPHTHALMOLOGY
End: 2023-01-23
Payer: MEDICARE

## 2023-01-23 DIAGNOSIS — H40.031: ICD-10-CM

## 2023-01-23 DIAGNOSIS — Z96.1: ICD-10-CM

## 2023-01-23 DIAGNOSIS — E11.9: ICD-10-CM

## 2023-01-23 DIAGNOSIS — H40.1131: ICD-10-CM

## 2023-01-23 PROCEDURE — 99214 OFFICE O/P EST MOD 30 MIN: CPT | Performed by: OPHTHALMOLOGY

## 2023-01-23 ASSESSMENT — REFRACTION_MANIFEST
OS_ADD: +2.75
OD_SPHERE: +0.50
OS_AXIS: 100
OS_CYLINDER: -0.75
OS_ADD: +2.75
OS_SPHERE: +1.00
OU_VA: 20/20
OS_VA1: 20/20-
OD_AXIS: 100
OD_VA1: 20/20
OD_SPHERE: +0.50
OD_CYLINDER: -0.50
OD_ADD: +2.75
OD_CYLINDER: -0.50
OD_ADD: +2.75
OS_AXIS: 100
OU_VA: 20/20
OD_VA1: 20/20
OD_AXIS: 100
OS_SPHERE: +1.00
OS_CYLINDER: -0.75
OS_VA1: 20/20-

## 2023-01-23 ASSESSMENT — CONFRONTATIONAL VISUAL FIELD TEST (CVF)
OD_FINDINGS: FULL
OS_FINDINGS: FULL

## 2023-01-23 ASSESSMENT — REFRACTION_CURRENTRX
OS_OVR_VA: 20/
OS_CYLINDER: -1.00
OD_SPHERE: +0.25
OD_CYLINDER: -0.25
OD_ADD: +2.75
OD_AXIS: 065
OD_OVR_VA: 20/
OS_AXIS: 096
OS_VPRISM_DIRECTION: PROGS
OD_VPRISM_DIRECTION: PROGS
OS_ADD: +2.75
OS_SPHERE: +0.75

## 2023-01-23 ASSESSMENT — KERATOMETRY
OD_K1POWER_DIOPTERS: 45.00
OD_K2POWER_DIOPTERS: 46.00
OS_AXISANGLE_DEGREES: 176
OD_AXISANGLE_DEGREES: 030
METHOD_AUTO_MANUAL: AUTO
OS_K2POWER_DIOPTERS: 45.75
OS_K1POWER_DIOPTERS: 45.25

## 2023-01-23 ASSESSMENT — PACHYMETRY
OS_CT_UM: 573
OS_CT_CORRECTION: -2
OD_CT_CORRECTION: -2
OD_CT_UM: 575

## 2023-01-23 ASSESSMENT — AXIALLENGTH_DERIVED
OD_AL: 22.7882
OS_AL: 22.6527
OS_AL: 22.6527
OD_AL: 22.7882
OD_AL: 22.6079
OS_AL: 22.6527

## 2023-01-23 ASSESSMENT — VISUAL ACUITY
OD_BCVA: 20/20-1
OS_BCVA: 20/20

## 2023-01-23 ASSESSMENT — REFRACTION_AUTOREFRACTION
OD_SPHERE: +1.25
OD_CYLINDER: -1.00
OS_CYLINDER: -1.25
OS_AXIS: 093
OD_AXIS: 100
OS_SPHERE: +1.25

## 2023-01-23 ASSESSMENT — SPHEQUIV_DERIVED
OS_SPHEQUIV: 0.625
OD_SPHEQUIV: 0.75
OD_SPHEQUIV: 0.25
OD_SPHEQUIV: 0.25
OS_SPHEQUIV: 0.625
OS_SPHEQUIV: 0.625

## 2023-01-23 ASSESSMENT — TONOMETRY
OD_IOP_MMHG: 20
OS_IOP_MMHG: 19

## 2023-05-08 ENCOUNTER — RX RENEWAL (OUTPATIENT)
Age: 68
End: 2023-05-08

## 2023-05-18 ENCOUNTER — OFFICE (OUTPATIENT)
Dept: URBAN - METROPOLITAN AREA CLINIC 12 | Facility: CLINIC | Age: 68
Setting detail: OPHTHALMOLOGY
End: 2023-05-18
Payer: MEDICARE

## 2023-05-18 DIAGNOSIS — H43.393: ICD-10-CM

## 2023-05-18 DIAGNOSIS — E11.9: ICD-10-CM

## 2023-05-18 DIAGNOSIS — H26.493: ICD-10-CM

## 2023-05-18 DIAGNOSIS — Z96.1: ICD-10-CM

## 2023-05-18 DIAGNOSIS — H43.811: ICD-10-CM

## 2023-05-18 DIAGNOSIS — H90.3: ICD-10-CM

## 2023-05-18 DIAGNOSIS — H40.1131: ICD-10-CM

## 2023-05-18 PROCEDURE — 92557 COMPREHENSIVE HEARING TEST: CPT

## 2023-05-18 PROCEDURE — 92014 COMPRE OPH EXAM EST PT 1/>: CPT | Performed by: OPHTHALMOLOGY

## 2023-05-18 PROCEDURE — 92250 FUNDUS PHOTOGRAPHY W/I&R: CPT | Performed by: OPHTHALMOLOGY

## 2023-05-18 PROCEDURE — 92567 TYMPANOMETRY: CPT

## 2023-05-18 ASSESSMENT — REFRACTION_CURRENTRX
OD_SPHERE: +0.75
OD_OVR_VA: 20/
OS_AXIS: 090
OS_SPHERE: +1.00
OS_ADD: +2.75
OS_CYLINDER: -0.75
OD_VPRISM_DIRECTION: PROGS
OD_AXIS: 102
OD_ADD: +2.75
OS_VPRISM_DIRECTION: PROGS
OD_CYLINDER: -0.75
OS_OVR_VA: 20/

## 2023-05-18 ASSESSMENT — TONOMETRY
OD_IOP_MMHG: 17
OS_IOP_MMHG: 18

## 2023-05-18 ASSESSMENT — KERATOMETRY
OS_AXISANGLE_DEGREES: 025
OS_K2POWER_DIOPTERS: 45.75
OD_AXISANGLE_DEGREES: 050
METHOD_AUTO_MANUAL: AUTO
OD_K1POWER_DIOPTERS: 44.75
OD_K2POWER_DIOPTERS: 45.75
OS_K1POWER_DIOPTERS: 45.00

## 2023-05-18 ASSESSMENT — REFRACTION_MANIFEST
OD_VA1: 20/20
OD_CYLINDER: -0.50
OD_AXIS: 100
OS_AXIS: 100
OD_AXIS: 100
OD_ADD: +2.75
OS_CYLINDER: -0.75
OS_SPHERE: +1.00
OD_ADD: +2.75
OS_ADD: +2.75
OS_ADD: +2.75
OS_VA1: 20/20-
OU_VA: 20/20
OS_VA1: 20/20-
OD_CYLINDER: -0.50
OU_VA: 20/20
OS_CYLINDER: -0.75
OS_AXIS: 100
OD_SPHERE: +0.50
OS_SPHERE: +1.00
OD_VA1: 20/20
OD_SPHERE: +0.50

## 2023-05-18 ASSESSMENT — VISUAL ACUITY
OD_BCVA: 20/25-3
OS_BCVA: 20/25+2

## 2023-05-18 ASSESSMENT — SPHEQUIV_DERIVED
OS_SPHEQUIV: 0.625
OD_SPHEQUIV: 0.25
OD_SPHEQUIV: 0.625
OS_SPHEQUIV: 0.625
OS_SPHEQUIV: 0.75
OD_SPHEQUIV: 0.25

## 2023-05-18 ASSESSMENT — AXIALLENGTH_DERIVED
OS_AL: 22.6951
OD_AL: 22.7377
OD_AL: 22.8742
OD_AL: 22.8742
OS_AL: 22.6951
OS_AL: 22.6502

## 2023-05-18 ASSESSMENT — REFRACTION_AUTOREFRACTION
OD_CYLINDER: -0.75
OD_AXIS: 101
OS_AXIS: 099
OD_SPHERE: +1.00
OS_CYLINDER: -1.50
OS_SPHERE: +1.50

## 2023-05-18 ASSESSMENT — PACHYMETRY
OS_CT_UM: 573
OD_CT_CORRECTION: -2
OD_CT_UM: 575
OS_CT_CORRECTION: -2

## 2023-05-18 ASSESSMENT — CONFRONTATIONAL VISUAL FIELD TEST (CVF)
OS_FINDINGS: FULL
OD_FINDINGS: FULL

## 2023-06-04 ENCOUNTER — RESULT CHARGE (OUTPATIENT)
Age: 68
End: 2023-06-04

## 2023-06-05 ENCOUNTER — APPOINTMENT (OUTPATIENT)
Dept: ENDOCRINOLOGY | Facility: CLINIC | Age: 68
End: 2023-06-05
Payer: MEDICARE

## 2023-06-05 ENCOUNTER — LABORATORY RESULT (OUTPATIENT)
Age: 68
End: 2023-06-05

## 2023-06-05 VITALS — BODY MASS INDEX: 23.56 KG/M2 | WEIGHT: 138 LBS | HEIGHT: 64 IN

## 2023-06-05 VITALS — OXYGEN SATURATION: 98 % | HEART RATE: 80 BPM | SYSTOLIC BLOOD PRESSURE: 120 MMHG | DIASTOLIC BLOOD PRESSURE: 66 MMHG

## 2023-06-05 DIAGNOSIS — I10 ESSENTIAL (PRIMARY) HYPERTENSION: ICD-10-CM

## 2023-06-05 PROCEDURE — 99214 OFFICE O/P EST MOD 30 MIN: CPT | Mod: 25

## 2023-06-05 PROCEDURE — 82962 GLUCOSE BLOOD TEST: CPT

## 2023-06-05 NOTE — ASSESSMENT
[Diabetes Foot Care] : diabetes foot care [Long Term Vascular Complications] : long term vascular complications of diabetes [Carbohydrate Consistent Diet] : carbohydrate consistent diet [Importance of Diet and Exercise] : importance of diet and exercise to improve glycemic control, achieve weight loss and improve cardiovascular health [Exercise/Effect on Glucose] : exercise/effect on glucose [Retinopathy Screening] : Patient was referred to ophthalmology for retinopathy screening [FreeTextEntry1] : T2DM\par -Reviewed risk/complication of uncontrolled DM \par -Increase exercise as tolerated 5 days a week x 30 mins/day\par -Increase dietary efforts, low carb/low sugar\par -Continue to check FS 4 x's a day and keep log, bring log to next appt\par -Repeat HgbA1C ASAP\par -Continue  mg BID \par -Patient is interest in CGM\par \par Glucose Sensor Necessity: This patient with diabetes performs 4 glucose checks per day utilizing a home blood glucose monitor. In addition, the patient has been to our office for an evaluation of their diabetes control within the past 6 months.\par \par \par HLD\par -continue statin\par -check lipids\par \par HTN\par BP stable todau \par \par \par Going to PCP tomorrow for labs\par \par RTO in 3 months NP\par RTO in 6 months MD\par \par

## 2023-06-05 NOTE — REVIEW OF SYSTEMS
[Fatigue] : fatigue [Polyuria] : polyuria [Recent Weight Gain (___ Lbs)] : no recent weight gain [Recent Weight Loss (___ Lbs)] : no recent weight loss [Visual Field Defect] : no visual field defect [Blurred Vision] : no blurred vision [Dysphagia] : no dysphagia [Neck Pain] : no neck pain [Chest Pain] : no chest pain [Palpitations] : no palpitations [Shortness Of Breath] : no shortness of breath [Nausea] : no nausea [Constipation] : no constipation [Vomiting] : no vomiting [Diarrhea] : no diarrhea [Polydipsia] : no polydipsia

## 2023-06-05 NOTE — HISTORY OF PRESENT ILLNESS
[FreeTextEntry1] : Patient was seen one time in 10/2021. She states she has been seeing her family doctor. Came in today beucase she had a few blood sugars that were above 200. No recent blood test. has been compliant with taking  mg BID.\par \par Quality: T2DM\par Severity: uncontrolled\par Duration: 10 years \par \par Modifying Factors: oral medication \par \par SMBG\par Twice a day \par 130-240s\par \par Current FS: 95 --> ate brekfast around 8:15 yogurt with fruit \par \par HgbA1C: no recent \par \par Current Regimen:\par Metformin 500 mg 1 tablet twice daily\par \par Eye Exam:  Last week, no DR\par Foot Exam: denies numbness and tingling in feet, poditry every 2 months \par Kidney Disease: none\par Heart Disease: none\par \par Weight: stable \par Diet: does not really watch\par B- plain yogurt and fruit\par L- pasta\par D- pasta, chocken salad\par Snacks- apples\par Drink - water \par Exercise: none\par Smoking:  none \par \par She c/o feeling very tired lately. She often looses balance--> going to see PCP tommorrow

## 2023-06-07 DIAGNOSIS — E83.52 HYPERCALCEMIA: ICD-10-CM

## 2023-06-07 LAB
25(OH)D3 SERPL-MCNC: 72.8 NG/ML
ALBUMIN SERPL ELPH-MCNC: 5.3 G/DL
ALP BLD-CCNC: 81 U/L
ALT SERPL-CCNC: 63 U/L
ANION GAP SERPL CALC-SCNC: 17 MMOL/L
AST SERPL-CCNC: 49 U/L
BILIRUB SERPL-MCNC: 0.6 MG/DL
BUN SERPL-MCNC: 17 MG/DL
CALCIUM SERPL-MCNC: 11.4 MG/DL
CHLORIDE SERPL-SCNC: 100 MMOL/L
CHOLEST SERPL-MCNC: 162 MG/DL
CO2 SERPL-SCNC: 25 MMOL/L
CREAT SERPL-MCNC: 0.74 MG/DL
CREAT SPEC-SCNC: 74 MG/DL
EGFR: 90 ML/MIN/1.73M2
ESTIMATED AVERAGE GLUCOSE: 134 MG/DL
GLUCOSE BLDC GLUCOMTR-MCNC: 95
GLUCOSE SERPL-MCNC: 88 MG/DL
HBA1C MFR BLD HPLC: 6.3 %
HDLC SERPL-MCNC: 56 MG/DL
LDLC SERPL CALC-MCNC: 68 MG/DL
MICROALBUMIN 24H UR DL<=1MG/L-MCNC: 45.4 MG/DL
MICROALBUMIN/CREAT 24H UR-RTO: 610 MG/G
NONHDLC SERPL-MCNC: 106 MG/DL
POTASSIUM SERPL-SCNC: 4.4 MMOL/L
PROT SERPL-MCNC: 7.9 G/DL
SODIUM SERPL-SCNC: 141 MMOL/L
T3FREE SERPL-MCNC: 2.96 PG/ML
T4 FREE SERPL-MCNC: 1.6 NG/DL
TRIGL SERPL-MCNC: 189 MG/DL
TSH SERPL-ACNC: 1.54 UIU/ML
VIT B12 SERPL-MCNC: 1246 PG/ML

## 2023-06-21 ENCOUNTER — NON-APPOINTMENT (OUTPATIENT)
Age: 68
End: 2023-06-21

## 2023-08-01 NOTE — ED PROVIDER NOTE - CROS ED PSYCH ALL NEG
-Blood pressure remains under excellent control  -He will continue with amlodipine 2.5 mg daily  -We have agreed we will see him back in 1 year and just prior to the next visit he will get a fasting BMP  
negative...

## 2023-09-05 ENCOUNTER — RX RENEWAL (OUTPATIENT)
Age: 68
End: 2023-09-05

## 2023-09-11 ENCOUNTER — APPOINTMENT (OUTPATIENT)
Dept: ENDOCRINOLOGY | Facility: CLINIC | Age: 68
End: 2023-09-11

## 2023-09-14 ENCOUNTER — APPOINTMENT (OUTPATIENT)
Dept: ENDOCRINOLOGY | Facility: CLINIC | Age: 68
End: 2023-09-14

## 2023-09-28 ENCOUNTER — APPOINTMENT (OUTPATIENT)
Dept: ENDOCRINOLOGY | Facility: CLINIC | Age: 68
End: 2023-09-28

## 2023-10-13 NOTE — REVIEW OF SYSTEMS
Instructions: This plan will send the code FBSE to the PM system.  DO NOT or CHANGE the price. Detail Level: Simple Price (Do Not Change): 0.00 [Negative] : Heme/Lymph

## 2023-11-30 ENCOUNTER — OFFICE (OUTPATIENT)
Dept: URBAN - METROPOLITAN AREA CLINIC 100 | Facility: CLINIC | Age: 68
Setting detail: OPHTHALMOLOGY
End: 2023-11-30
Payer: MEDICARE

## 2023-11-30 VITALS — HEIGHT: 55 IN

## 2023-11-30 DIAGNOSIS — H26.493: ICD-10-CM

## 2023-11-30 DIAGNOSIS — H40.1131: ICD-10-CM

## 2023-11-30 DIAGNOSIS — H43.393: ICD-10-CM

## 2023-11-30 PROCEDURE — 92133 CPTRZD OPH DX IMG PST SGM ON: CPT | Performed by: OPHTHALMOLOGY

## 2023-11-30 PROCEDURE — 92083 EXTENDED VISUAL FIELD XM: CPT | Performed by: OPHTHALMOLOGY

## 2023-11-30 PROCEDURE — 99214 OFFICE O/P EST MOD 30 MIN: CPT | Performed by: OPHTHALMOLOGY

## 2023-11-30 ASSESSMENT — REFRACTION_AUTOREFRACTION
OD_SPHERE: +1.50
OD_AXIS: 106
OS_SPHERE: +1.50
OD_CYLINDER: -1.50
OS_CYLINDER: -1.50
OS_AXIS: 088

## 2023-11-30 ASSESSMENT — REFRACTION_MANIFEST
OS_VA1: 20/20-
OS_CYLINDER: -0.75
OD_SPHERE: +0.50
OS_CYLINDER: -0.75
OU_VA: 20/20
OS_SPHERE: +1.00
OS_ADD: +2.75
OS_AXIS: 100
OS_AXIS: 100
OD_SPHERE: +0.50
OS_VA1: 20/20-
OD_VA1: 20/20
OD_CYLINDER: -0.50
OD_AXIS: 100
OD_ADD: +2.75
OS_SPHERE: +1.00
OD_AXIS: 100
OD_CYLINDER: -0.50
OD_ADD: +2.75
OU_VA: 20/20
OS_ADD: +2.75
OD_VA1: 20/20

## 2023-11-30 ASSESSMENT — SPHEQUIV_DERIVED
OD_SPHEQUIV: 0.25
OS_SPHEQUIV: 0.625
OS_SPHEQUIV: 0.625
OD_SPHEQUIV: 0.75
OS_SPHEQUIV: 0.75
OD_SPHEQUIV: 0.25

## 2023-11-30 ASSESSMENT — REFRACTION_CURRENTRX
OD_CYLINDER: -0.75
OD_SPHERE: +0.75
OD_ADD: +2.75
OS_VPRISM_DIRECTION: PROGS
OS_OVR_VA: 20/
OD_OVR_VA: 20/
OS_AXIS: 090
OD_VPRISM_DIRECTION: PROGS
OD_AXIS: 102
OS_SPHERE: +1.00
OS_CYLINDER: -0.75
OS_ADD: +2.75

## 2023-11-30 ASSESSMENT — CONFRONTATIONAL VISUAL FIELD TEST (CVF)
OD_FINDINGS: FULL
OS_FINDINGS: FULL

## 2023-12-12 ENCOUNTER — RX RENEWAL (OUTPATIENT)
Age: 68
End: 2023-12-12

## 2024-01-03 ENCOUNTER — NON-APPOINTMENT (OUTPATIENT)
Age: 69
End: 2024-01-03

## 2024-01-03 ENCOUNTER — APPOINTMENT (OUTPATIENT)
Dept: ENDOCRINOLOGY | Facility: CLINIC | Age: 69
End: 2024-01-03
Payer: MEDICARE

## 2024-01-03 VITALS
DIASTOLIC BLOOD PRESSURE: 60 MMHG | OXYGEN SATURATION: 98 % | HEART RATE: 60 BPM | WEIGHT: 139 LBS | BODY MASS INDEX: 23.73 KG/M2 | SYSTOLIC BLOOD PRESSURE: 116 MMHG | HEIGHT: 64 IN

## 2024-01-03 DIAGNOSIS — R80.9 PROTEINURIA, UNSPECIFIED: ICD-10-CM

## 2024-01-03 LAB — 25(OH)D3 SERPL-MCNC: 51.2 NG/ML

## 2024-01-03 PROCEDURE — 99214 OFFICE O/P EST MOD 30 MIN: CPT

## 2024-01-03 RX ORDER — CYANOCOBALAMIN (VITAMIN B-12) 1000 MCG
1000 TABLET ORAL
Refills: 0 | Status: ACTIVE | COMMUNITY

## 2024-01-03 RX ORDER — BLOOD-GLUCOSE METER
W/DEVICE EACH MISCELLANEOUS
Qty: 1 | Refills: 1 | Status: ACTIVE | COMMUNITY
Start: 2021-10-20 | End: 1900-01-01

## 2024-01-03 RX ORDER — AMLODIPINE BESYLATE 5 MG/1
TABLET ORAL DAILY
Refills: 0 | Status: ACTIVE | COMMUNITY

## 2024-01-03 RX ORDER — ASPIRIN 81 MG
81 TABLET, DELAYED RELEASE (ENTERIC COATED) ORAL
Refills: 0 | Status: ACTIVE | COMMUNITY

## 2024-01-03 RX ORDER — LISINOPRIL 20 MG/1
20 TABLET ORAL
Refills: 0 | Status: ACTIVE | COMMUNITY

## 2024-01-03 RX ORDER — ESTRADIOL 0.1 MG/G
0.1 CREAM VAGINAL
Qty: 425 | Refills: 0 | Status: DISCONTINUED | COMMUNITY
Start: 2018-10-30 | End: 2024-01-03

## 2024-01-03 RX ORDER — METOPROLOL TARTRATE 25 MG/1
25 TABLET, FILM COATED ORAL
Refills: 0 | Status: ACTIVE | COMMUNITY

## 2024-01-03 RX ORDER — LATANOPROST/PF 0.005 %
0.01 DROPS OPHTHALMIC (EYE)
Qty: 25 | Refills: 0 | Status: DISCONTINUED | COMMUNITY
Start: 2018-10-08 | End: 2024-01-03

## 2024-01-03 RX ORDER — LISINOPRIL 40 MG/1
40 TABLET ORAL
Qty: 30 | Refills: 0 | Status: DISCONTINUED | COMMUNITY
Start: 2017-12-13 | End: 2024-01-03

## 2024-01-03 RX ORDER — LANCETS 30 GAUGE
EACH MISCELLANEOUS
Refills: 0 | Status: ACTIVE | COMMUNITY

## 2024-01-03 RX ORDER — ATORVASTATIN CALCIUM 40 MG/1
40 TABLET, FILM COATED ORAL
Qty: 30 | Refills: 0 | Status: DISCONTINUED | COMMUNITY
Start: 2018-10-24 | End: 2024-01-03

## 2024-01-03 RX ORDER — LANCETS 33 GAUGE
EACH MISCELLANEOUS
Qty: 1 | Refills: 1 | Status: DISCONTINUED | COMMUNITY
Start: 2021-11-03 | End: 2024-01-03

## 2024-01-03 RX ORDER — SIMVASTATIN 10 MG/1
10 TABLET, FILM COATED ORAL
Refills: 0 | Status: ACTIVE | COMMUNITY

## 2024-01-04 ENCOUNTER — RX CHANGE (OUTPATIENT)
Age: 69
End: 2024-01-04

## 2024-01-04 PROBLEM — R80.9 PROTEINURIA, UNSPECIFIED TYPE: Status: ACTIVE | Noted: 2024-01-04

## 2024-01-04 LAB
ALBUMIN SERPL ELPH-MCNC: 4.7 G/DL
ALP BLD-CCNC: 73 U/L
ALT SERPL-CCNC: 41 U/L
ANION GAP SERPL CALC-SCNC: 12 MMOL/L
AST SERPL-CCNC: 22 U/L
BILIRUB SERPL-MCNC: 0.5 MG/DL
BUN SERPL-MCNC: 19 MG/DL
CALCIUM SERPL-MCNC: 9.9 MG/DL
CALCIUM SERPL-MCNC: 9.9 MG/DL
CHLORIDE SERPL-SCNC: 101 MMOL/L
CHOLEST SERPL-MCNC: 154 MG/DL
CO2 SERPL-SCNC: 23 MMOL/L
CREAT SERPL-MCNC: 0.69 MG/DL
CREAT SPEC-SCNC: 59 MG/DL
EGFR: 96 ML/MIN/1.73M2
ESTIMATED AVERAGE GLUCOSE: 134 MG/DL
GLUCOSE SERPL-MCNC: 143 MG/DL
HBA1C MFR BLD HPLC: 6.3 %
HDLC SERPL-MCNC: 50 MG/DL
LDLC SERPL CALC-MCNC: 80 MG/DL
MICROALBUMIN 24H UR DL<=1MG/L-MCNC: 21.2 MG/DL
MICROALBUMIN/CREAT 24H UR-RTO: 361 MG/G
NONHDLC SERPL-MCNC: 104 MG/DL
PARATHYROID HORMONE INTACT: 33 PG/ML
POTASSIUM SERPL-SCNC: 4.7 MMOL/L
PROT SERPL-MCNC: 6.8 G/DL
SODIUM SERPL-SCNC: 136 MMOL/L
TRIGL SERPL-MCNC: 132 MG/DL
TSH SERPL-ACNC: 0.98 UIU/ML

## 2024-01-04 RX ORDER — BLOOD-GLUCOSE,RECEIVER,CONT
EACH MISCELLANEOUS
Qty: 1 | Refills: 0 | Status: ACTIVE | COMMUNITY
Start: 2024-01-03 | End: 1900-01-01

## 2024-01-11 ENCOUNTER — APPOINTMENT (OUTPATIENT)
Dept: MAMMOGRAPHY | Facility: CLINIC | Age: 69
End: 2024-01-11
Payer: MEDICARE

## 2024-01-11 ENCOUNTER — OUTPATIENT (OUTPATIENT)
Dept: OUTPATIENT SERVICES | Facility: HOSPITAL | Age: 69
LOS: 1 days | End: 2024-01-11
Payer: MEDICARE

## 2024-01-11 DIAGNOSIS — Z00.8 ENCOUNTER FOR OTHER GENERAL EXAMINATION: ICD-10-CM

## 2024-01-11 DIAGNOSIS — Z98.890 OTHER SPECIFIED POSTPROCEDURAL STATES: Chronic | ICD-10-CM

## 2024-01-11 DIAGNOSIS — Z12.31 ENCOUNTER FOR SCREENING MAMMOGRAM FOR MALIGNANT NEOPLASM OF BREAST: ICD-10-CM

## 2024-01-11 PROCEDURE — 77067 SCR MAMMO BI INCL CAD: CPT

## 2024-01-11 PROCEDURE — 77067 SCR MAMMO BI INCL CAD: CPT | Mod: 26

## 2024-01-11 PROCEDURE — 77063 BREAST TOMOSYNTHESIS BI: CPT

## 2024-01-11 PROCEDURE — 77063 BREAST TOMOSYNTHESIS BI: CPT | Mod: 26

## 2024-02-06 ENCOUNTER — APPOINTMENT (OUTPATIENT)
Dept: NEPHROLOGY | Facility: CLINIC | Age: 69
End: 2024-02-06

## 2024-02-22 ENCOUNTER — APPOINTMENT (OUTPATIENT)
Dept: NEPHROLOGY | Facility: CLINIC | Age: 69
End: 2024-02-22
Payer: MEDICARE

## 2024-02-22 VITALS
SYSTOLIC BLOOD PRESSURE: 112 MMHG | HEIGHT: 64 IN | BODY MASS INDEX: 23.73 KG/M2 | WEIGHT: 139 LBS | OXYGEN SATURATION: 99 % | TEMPERATURE: 97.9 F | DIASTOLIC BLOOD PRESSURE: 72 MMHG | HEART RATE: 65 BPM

## 2024-02-22 PROCEDURE — 99203 OFFICE O/P NEW LOW 30 MIN: CPT

## 2024-02-22 NOTE — PHYSICAL EXAM
[TextEntry] : Gen: NAD, well-appearing; HEENT: Supple neck, MMM Pulm: CTA CV: RRR, no rub Back: No spinal or CVA tenderness Abd: +BS, soft, nontender/nondistended LE: Warm, no edema Neuro: No focal deficits Psych: Normal affect Skin: Warm, without rashes

## 2024-02-22 NOTE — ASSESSMENT
[FreeTextEntry1] : 68-year-old female with past medical history significant for diabetes mellitus who was seen today in outpatient CKD clinic for discussion of labs and evaluation of renal function. History of DM for last 15 years, controlled.  Last A1c 6.3.  Follows endocrinology. Found to have proteinuria on labs for which she was referred.  Patient is on lisinopril.   Last GFR of 96 mL/min UACR trend 1356> 610> 361 mg/gram Patient on lisinopril Result discussed, likely microalbuminuria related to years of DM Will obtain a renal ultrasound and follow-up.  DM: Controlled, follow-up with endocrinology. Hypertension: Controlled, continue current medications. Bladder prolapse: Post mesh, follow-up with urology.  RTC 2 months

## 2024-02-22 NOTE — HISTORY OF PRESENT ILLNESS
[FreeTextEntry1] : Patient is 68-year-old female with past medical history significant for diabetes mellitus who was seen today in outpatient CKD clinic for discussion of labs and evaluation of renal function. History of DM for last 15 years, controlled.  Last A1c 6.3.  Follows endocrinology. Found to have proteinuria on labs for which she was referred.  Patient is on lisinopril. Complains of hesitancy, history of mesh for bladder support. No CAD.  Denies dysuria, gross hematuria, SOB, CP, cough, expectoration, fever, chills, palpitation, syncope, urgency, swelling on feet, joint pain. No history of chronic NSAID use, nephrolithiasis or renal diseases in the family.   REVIEW OF SYSTEM:  All systems were reviewed in detail.  Pertinent positive and negatives have been mentioned in history of present illness.  The rest are negative.

## 2024-03-18 ENCOUNTER — RX RENEWAL (OUTPATIENT)
Age: 69
End: 2024-03-18

## 2024-04-01 ENCOUNTER — OFFICE (OUTPATIENT)
Dept: URBAN - METROPOLITAN AREA CLINIC 100 | Facility: CLINIC | Age: 69
Setting detail: OPHTHALMOLOGY
End: 2024-04-01
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
## 2024-04-01 PROCEDURE — NO SHOW FE NO SHOW FEE: Performed by: OPHTHALMOLOGY

## 2024-04-08 ENCOUNTER — RX RENEWAL (OUTPATIENT)
Age: 69
End: 2024-04-08

## 2024-04-08 RX ORDER — LANCETS 33 GAUGE
EACH MISCELLANEOUS
Qty: 100 | Refills: 0 | Status: ACTIVE | COMMUNITY
Start: 2022-12-30 | End: 1900-01-01

## 2024-04-11 ENCOUNTER — APPOINTMENT (OUTPATIENT)
Dept: ENDOCRINOLOGY | Facility: CLINIC | Age: 69
End: 2024-04-11
Payer: MEDICARE

## 2024-04-11 VITALS
HEART RATE: 71 BPM | BODY MASS INDEX: 23.56 KG/M2 | OXYGEN SATURATION: 98 % | DIASTOLIC BLOOD PRESSURE: 46 MMHG | HEIGHT: 64 IN | WEIGHT: 138 LBS | SYSTOLIC BLOOD PRESSURE: 118 MMHG

## 2024-04-11 DIAGNOSIS — E83.52 HYPERCALCEMIA: ICD-10-CM

## 2024-04-11 DIAGNOSIS — E04.2 NONTOXIC MULTINODULAR GOITER: ICD-10-CM

## 2024-04-11 DIAGNOSIS — E11.9 TYPE 2 DIABETES MELLITUS W/OUT COMPLICATIONS: ICD-10-CM

## 2024-04-11 DIAGNOSIS — E78.5 HYPERLIPIDEMIA, UNSPECIFIED: ICD-10-CM

## 2024-04-11 LAB — GLUCOSE BLDC GLUCOMTR-MCNC: 100

## 2024-04-11 PROCEDURE — 99214 OFFICE O/P EST MOD 30 MIN: CPT

## 2024-04-11 PROCEDURE — G2211 COMPLEX E/M VISIT ADD ON: CPT

## 2024-04-11 PROCEDURE — 82962 GLUCOSE BLOOD TEST: CPT

## 2024-04-11 RX ORDER — METFORMIN HYDROCHLORIDE 1000 MG/1
1000 TABLET, COATED ORAL DAILY
Qty: 90 | Refills: 3 | Status: ACTIVE | COMMUNITY
Start: 2019-02-15 | End: 1900-01-01

## 2024-04-11 NOTE — HISTORY OF PRESENT ILLNESS
[FreeTextEntry1] : 69 yo female here for DM, thyroid nodules and hypercalcemia  Quality: T2DM Severity: uncontrolled Duration: 10 years  SMBG: Twice a day 174, has 200-300s 1 hour after the meal  HgbA1C: 6.3  Current Regimen: Metformin 500 mg 1 tablet twice daily    Eye Exam: Last week, no DR  Foot Exam: denies numbness and tingling in feet, podiatry every 2 months  Kidney Disease: none  Heart Disease: none

## 2024-04-11 NOTE — ASSESSMENT
[FreeTextEntry1] : Type 2 DM with hyperglycemia  Not on Longterm insulin   Counseled on risk for MTC, pancreatitis, worsening gall bladder stones and worsening DR Has Dexcom G7 but forgot password for [facility]  We agreed on the following plan today. Continue Metformin 1g once daily  Continue Mounjaro 5  mg weekly, if it is giving to much nausea can lower to 2.5 weekly  Continue to check sugars 3-4 times a day through dexcom  Continue on healthy diet. Continue to exercise. Please see an eye doctor Please see a foot doctor   HLD On statin  Check LDL   HTN Bp acceptable  On Ace inh/ARB  Thyroid nodules  Benign FNA in [facility] 1.5 solid iso  3 small cystic nodules on the right side around 2 mm  Repeat thyroid US 3/25   Hypercalcemia resolved  Stopped Ca supplements  PTH and ca normalized   DM nephropathy with proteinuria  Seen by nephrology   Osteopenia   Recent DEXA scan shows osteopenia in 6/23, cannot compare DEXA scans were done at different facilities. As per AACE guidelines  patients (post-menopausal women specifically) to maintain adequate dietary intake of calcium, to a total intake FROM THE diet mainly of 1,200 mg/day.    I spent 35 minutes discussing with patient face to face and non face to face reviewing documentations, labs, and/or imaging, also discussing the management plans.  RTC in 3 months

## 2024-04-11 NOTE — PHYSICAL EXAM
[Alert] : alert [Well Nourished] : well nourished [No Neck Mass] : no neck mass was observed [No LAD] : no lymphadenopathy [Thyroid Not Enlarged] : the thyroid was not enlarged [No Respiratory Distress] : no respiratory distress [No Accessory Muscle Use] : no accessory muscle use [Clear to Auscultation] : lungs were clear to auscultation bilaterally [Normal S1, S2] : normal S1 and S2

## 2024-04-11 NOTE — ASSESSMENT
[FreeTextEntry1] : Type 2 DM with hyperglycemia  Not on Longterm insulin    I have counseled the patient on the benefits, risks and side effects of this GLP-1 agonist. We discussed the benefits from cardiac standpoint and on glucose and weight reduction. Also discussed side effects with patient including possible nausea, vomiting or other GI side effects. I have also discussed the very rare risk of pancreatitis, including the symptoms to look out for, such as food intolerance, nausea/emesis, and abdominal pain. The patient also was explained the link with medullary thyroid cancer and has denied medical or family history of medullary thyroid cancer. Also counseled on the gallbladder inflammation risks. We also discussed the risk of worsening retinopathy and the need to follow up with an eye doctor. Also the risk for worsening gall bladder stones was discussed. Patient currently does not have abdominal pain. Patient also exhibited full understanding   We agreed on the following plan today. Continue Metformin 1g once daily  Start Mounjaro 2.5 mg weekly  Continue to check sugars 1-2 times a day. Continue on healthy diet. Continue to exercise. Please see an eye doctor Please see a foot doctor   HLD On statin and Gemfibrozil, dangerous combo, stop Gemfibrozil now  Check LDL   HTN Bp acceptable  On Ace inh/ARB  Thyroid nodules  Benign FNA in [facility] 1.5 solid iso  3 small cystic nodules on the right side around 2 mm  Repeat thyroid US 6/25   Hypercalcemia  Check PTH and Ca  Stopped Ca supplements    Osteopenia   Recent DEXA scan shows osteopenia in 6/23, cannot compare  DEXA scans were done at different facilities.  As per AACE guidelines  patients (post-menopausal women specifically) to maintain adequate dietary intake of calcium, to a total intake FROM THE diet mainly of 1,200 mg/day.    I spent  minutes discussing with patient face to face and non face to face reviewing documentations, labs, and/or imaging, also discussing the management plans.    I spent 40  minutes discussing with patient face to face and non face to face reviewing documentations, labs, and/or imaging, also discussing the management plans.   RTC in 3 months

## 2024-04-11 NOTE — PHYSICAL EXAM
[Alert] : alert [Well Nourished] : well nourished [No Neck Mass] : no neck mass was observed [No LAD] : no lymphadenopathy [Thyroid Not Enlarged] : the thyroid was not enlarged [No Respiratory Distress] : no respiratory distress [No Accessory Muscle Use] : no accessory muscle use [Clear to Auscultation] : lungs were clear to auscultation bilaterally [Normal S1, S2] : normal S1 and S2 [#3 Diminished] : number 3 was diminished [#1 Diminished] : number 1 was normal [#2 Diminished] : number 2 was normal [#5 Diminished] : number 5 was normal [#4 Diminished] : number 4 was normal [FreeTextEntry8] : Has calluses

## 2024-04-11 NOTE — HISTORY OF PRESENT ILLNESS
[FreeTextEntry1] : 69 yo female here for DM, thyroid nodules  Quality: T2DM Severity: uncontrolled Duration: 10 years  SMBG: Twice a day 174, has 200-300s 1 hour after the meal  HgbA1C: 6.3  Current Regimen: Metformin 1000 mg 1 tablet daily   Eye Exam: in 1/24, no DR Foot Exam: denies numbness and tingling in feet, podiatry every 2 months Kidney Disease: yes, seeing nephrology  Heart Disease: none

## 2024-04-12 ENCOUNTER — NON-APPOINTMENT (OUTPATIENT)
Age: 69
End: 2024-04-12

## 2024-04-12 LAB
ALBUMIN SERPL ELPH-MCNC: 4.9 G/DL
ALP BLD-CCNC: 66 U/L
ALT SERPL-CCNC: 50 U/L
ANION GAP SERPL CALC-SCNC: 14 MMOL/L
AST SERPL-CCNC: 33 U/L
BILIRUB SERPL-MCNC: 0.7 MG/DL
BUN SERPL-MCNC: 14 MG/DL
CALCIUM SERPL-MCNC: 10.8 MG/DL
CHLORIDE SERPL-SCNC: 99 MMOL/L
CHOLEST SERPL-MCNC: 150 MG/DL
CO2 SERPL-SCNC: 25 MMOL/L
CREAT SERPL-MCNC: 0.74 MG/DL
EGFR: 89 ML/MIN/1.73M2
ESTIMATED AVERAGE GLUCOSE: 105 MG/DL
GLUCOSE SERPL-MCNC: 94 MG/DL
HBA1C MFR BLD HPLC: 5.3 %
HDLC SERPL-MCNC: 52 MG/DL
LDLC SERPL CALC-MCNC: 70 MG/DL
NONHDLC SERPL-MCNC: 97 MG/DL
POTASSIUM SERPL-SCNC: 4.3 MMOL/L
PROT SERPL-MCNC: 7.1 G/DL
SODIUM SERPL-SCNC: 138 MMOL/L
TRIGL SERPL-MCNC: 159 MG/DL

## 2024-04-19 PROBLEM — H33.321 RETINAL HOLE; RIGHT EYE: Status: ACTIVE | Noted: 2024-04-02

## 2024-06-17 ENCOUNTER — RX RENEWAL (OUTPATIENT)
Age: 69
End: 2024-06-17

## 2024-06-17 RX ORDER — BLOOD-GLUCOSE SENSOR
EACH MISCELLANEOUS
Qty: 3 | Refills: 0 | Status: ACTIVE | COMMUNITY
Start: 2024-01-03 | End: 1900-01-01

## 2024-06-24 ENCOUNTER — RX RENEWAL (OUTPATIENT)
Age: 69
End: 2024-06-24

## 2024-06-24 RX ORDER — BLOOD SUGAR DIAGNOSTIC
STRIP MISCELLANEOUS
Qty: 30 | Refills: 3 | Status: ACTIVE | COMMUNITY
Start: 2023-06-05 | End: 1900-01-01

## 2024-06-26 ENCOUNTER — RX RENEWAL (OUTPATIENT)
Age: 69
End: 2024-06-26

## 2024-06-26 RX ORDER — TIRZEPATIDE 5 MG/.5ML
5 INJECTION, SOLUTION SUBCUTANEOUS
Qty: 4 | Refills: 0 | Status: ACTIVE | COMMUNITY
Start: 2024-01-03 | End: 1900-01-01

## 2024-07-15 ENCOUNTER — RX RENEWAL (OUTPATIENT)
Age: 69
End: 2024-07-15

## 2024-07-23 ENCOUNTER — RX RENEWAL (OUTPATIENT)
Age: 69
End: 2024-07-23

## 2024-08-20 ENCOUNTER — APPOINTMENT (OUTPATIENT)
Dept: ENDOCRINOLOGY | Facility: CLINIC | Age: 69
End: 2024-08-20

## 2024-08-20 ENCOUNTER — RX RENEWAL (OUTPATIENT)
Age: 69
End: 2024-08-20

## 2024-10-14 ENCOUNTER — RX RENEWAL (OUTPATIENT)
Age: 69
End: 2024-10-14

## 2024-11-25 ENCOUNTER — RX RENEWAL (OUTPATIENT)
Age: 69
End: 2024-11-25

## 2024-11-25 ENCOUNTER — OFFICE (OUTPATIENT)
Dept: URBAN - METROPOLITAN AREA CLINIC 100 | Facility: CLINIC | Age: 69
Setting detail: OPHTHALMOLOGY
End: 2024-11-25
Payer: MEDICARE

## 2024-11-25 DIAGNOSIS — Z96.1: ICD-10-CM

## 2024-11-25 DIAGNOSIS — H43.393: ICD-10-CM

## 2024-11-25 DIAGNOSIS — E11.9: ICD-10-CM

## 2024-11-25 DIAGNOSIS — H40.1131: ICD-10-CM

## 2024-11-25 DIAGNOSIS — H26.493: ICD-10-CM

## 2024-11-25 DIAGNOSIS — H43.811: ICD-10-CM

## 2024-11-25 PROCEDURE — 92083 EXTENDED VISUAL FIELD XM: CPT | Performed by: OPHTHALMOLOGY

## 2024-11-25 PROCEDURE — 99214 OFFICE O/P EST MOD 30 MIN: CPT | Performed by: OPHTHALMOLOGY

## 2024-11-25 PROCEDURE — 92250 FUNDUS PHOTOGRAPHY W/I&R: CPT | Performed by: OPHTHALMOLOGY

## 2024-11-25 ASSESSMENT — TONOMETRY
OS_IOP_MMHG: 21
OD_IOP_MMHG: 20

## 2024-11-25 ASSESSMENT — REFRACTION_MANIFEST
OU_VA: 20/20
OD_CYLINDER: -0.50
OS_CYLINDER: -0.75
OS_AXIS: 100
OD_ADD: +2.75
OD_SPHERE: +0.50
OS_AXIS: 100
OS_VA1: 20/20-
OD_SPHERE: +0.50
OS_ADD: +2.75
OU_VA: 20/20
OD_AXIS: 100
OD_VA1: 20/20
OS_ADD: +2.75
OS_CYLINDER: -0.75
OS_SPHERE: +1.00
OS_VA1: 20/20-
OS_SPHERE: +1.00
OD_VA1: 20/20
OD_ADD: +2.75
OD_CYLINDER: -0.50
OD_AXIS: 100

## 2024-11-25 ASSESSMENT — REFRACTION_AUTOREFRACTION
OS_SPHERE: +1.75
OS_AXIS: 094
OD_SPHERE: +1.00
OD_AXIS: 102
OD_CYLINDER: -1.00
OS_CYLINDER: -1.75

## 2024-11-25 ASSESSMENT — CONFRONTATIONAL VISUAL FIELD TEST (CVF)
OS_FINDINGS: FULL
OD_FINDINGS: FULL

## 2024-11-25 ASSESSMENT — KERATOMETRY
OD_K1POWER_DIOPTERS: 45.50
OD_AXISANGLE_DEGREES: 032
METHOD_AUTO_MANUAL: AUTO
OD_K2POWER_DIOPTERS: 46.50
OS_K2POWER_DIOPTERS: 46.00
OS_AXISANGLE_DEGREES: 176
OS_K1POWER_DIOPTERS: 45.25

## 2024-11-25 ASSESSMENT — REFRACTION_CURRENTRX
OS_CYLINDER: -0.75
OD_CYLINDER: -0.50
OS_AXIS: 090
OD_VPRISM_DIRECTION: PROGS
OS_ADD: +2.75
OS_OVR_VA: 20/
OD_SPHERE: +0.50
OD_OVR_VA: 20/
OS_SPHERE: +1.00
OD_AXIS: 105
OS_VPRISM_DIRECTION: PROGS
OD_ADD: +2.75

## 2024-11-25 ASSESSMENT — PACHYMETRY
OD_CT_UM: 575
OS_CT_CORRECTION: -2
OS_CT_UM: 573
OD_CT_CORRECTION: -2

## 2024-11-25 ASSESSMENT — VISUAL ACUITY
OD_BCVA: 20/20-
OS_BCVA: 20/20-

## 2024-12-03 PROBLEM — H33.321 RETINAL HOLE; RIGHT EYE: Status: ACTIVE | Noted: 2024-12-03

## 2024-12-04 ENCOUNTER — OUTPATIENT (OUTPATIENT)
Dept: OUTPATIENT SERVICES | Facility: HOSPITAL | Age: 69
LOS: 1 days | End: 2024-12-04
Payer: MEDICARE

## 2024-12-04 ENCOUNTER — APPOINTMENT (OUTPATIENT)
Dept: RADIOLOGY | Facility: CLINIC | Age: 69
End: 2024-12-04
Payer: MEDICARE

## 2024-12-04 DIAGNOSIS — Z12.39 ENCOUNTER FOR OTHER SCREENING FOR MALIGNANT NEOPLASM OF BREAST: ICD-10-CM

## 2024-12-04 DIAGNOSIS — Z98.890 OTHER SPECIFIED POSTPROCEDURAL STATES: Chronic | ICD-10-CM

## 2024-12-04 PROCEDURE — 77080 DXA BONE DENSITY AXIAL: CPT

## 2024-12-04 PROCEDURE — 77080 DXA BONE DENSITY AXIAL: CPT | Mod: 26

## 2024-12-12 NOTE — ED PROVIDER NOTE - NS ED MD DISPO ADMITTING SERVICE
Progress Note - Cardiology   Name: Tom Stewart 89 y.o. male I MRN: [id number]  Unit/Bed#: 2 E 264-01 I Date of Admission: 12/6/2024   Date of Service: 12/12/2024 I Hospital Day: 6     Assessment & Plan  Acute on chronic heart failure with preserved ejection fraction (HCC)  Wt Readings from Last 3 Encounters:   12/11/24 96.1 kg (211 lb 13.8 oz)   12/02/24 96.6 kg (213 lb)   10/30/24 91.6 kg (201 lb 15.1 oz)   Volume status has improved.    Patient was switched to Bumex 2 mg twice daily.  Renal function stable in the setting of CKD.  Recommend strict I/O's, daily weights, and sodium restriction.  Overall conservative management based on advanced age and multiple comorbidities as well as dementia and CKD.  Patient is DNR/DNI.  Present ejection fraction is 35% down from 50% 1 year ago.  Patient is a poor candidate for LifeVest given dementia.  Patient is a poor candidate for any invasive cardiac evaluation.  Consider further goals of care and palliative care consultation.  Discussed with hospitalist service.  Sign off.  Please reconsult as needed.  Left Ventricle: Left ventricular cavity size is normal. Wall thickness is normal. The left ventricular ejection fraction is 35%. Systolic function is moderately reduced. There is moderate global hypokinesis with regional variation. Diastolic function is severely abnormal, consistent with ungraded restrictive relaxation.    Right Ventricle: Right ventricular cavity size is dilated. Systolic function is normal.    Left Atrium: The atrium is dilated.    Right Atrium: The atrium is dilated.    Aortic Valve: The aortic valve is trileaflet. The leaflets are thickened and calcified with reduced mobility. There is mild regurgitation. There is moderate to severe stenosis.  Peak velocity was 3.22 m/s.  Peak and mean gradients across the valve were 42 and 26 mmHg respectively.  Aortic valve area by the continuity equation method was 0.79 cm².    Mitral Valve: There is mild annular  calcification. There is mild to moderate regurgitation.    Tricuspid Valve: There is mild regurgitation. The right ventricular systolic pressure is mildly elevated. The estimated right ventricular systolic pressure is 46.00 mmHg.    Pulmonic Valve: There is trace regurgitation.    IVC/SVC: The right atrial pressure is estimated at 15.0 mmHg. The inferior vena cava is dilated. Respirophasic changes were blunted (less than 50% variation).  CAD s/p PCI to LAD  S/p  PCI with balloon angioplasty and CONCEPCION to each of the lesions.  Continue ASA and Toprol   Not on statin due to history of allergy (hives).  Paroxysmal atrial fibrillation (HCC)  HR is well-controlled, transition to Toprol-XL 50 mg daily.  [id number] at least 7, continue Eliquis 2.5 mg bid (age, creatinine).  Moderate aortic stenosis  Patient/family previously opted for conservative management given advanced age in setting of multiple comorbidities including dementia.  Primary hypertension  BP is elevated, continue to monitor.    Continue Toprol-XL, lisinopril and discontinue amlodipine  Start nifedipine 60 mg daily   Type 2 diabetes mellitus with stage 4 chronic kidney disease, without long-term current use of insulin (HCC)  Lab Results   Component Value Date    HGBA1C 8.5 (H) 12/06/2024     Recent Labs     12/11/24  1616 12/11/24  2116 12/12/24  0735 12/12/24  1113   POCGLU 219* 388* 181* 232*   Blood Sugar Average: Last 72 hrs:  (P) [id number]  Management per primary service.  Stage 4 chronic kidney disease (HCC)  Lab Results   Component Value Date    EGFR 28 12/12/2024    EGFR 29 12/11/2024    EGFR 29 12/10/2024    CREATININE 2.01 (H) 12/12/2024    CREATININE 1.98 (H) 12/11/2024    CREATININE 1.97 (H) 12/10/2024   Management per primary service.  CKD presently stable.  Other Alzheimer's disease (HCC)  Management per primary service.    General Cardiology   Progress Note   Tom Stewart 89 y.o. male MRN: [id number]  Unit/Bed#: 2 E 264-01  Encounter: [id number]      SUBJECTIVE:   Patient is a poor historian secondary to dementia.  Denies shortness of breath.    REVIEW OF SYSTEMS:  Constitutional:  Denies fever or chills   Eyes:  Denies change in visual acuity   HENT:  Denies nasal congestion or sore throat   Respiratory:  shortness of breath   Cardiovascular:  edema   GI:  Denies abdominal pain, nausea, vomiting, bloody stools or diarrhea   :  Denies dysuria, frequency, difficulty in micturition and nocturia  Musculoskeletal:  Denies back pain or joint pain   Neurologic: Dementia  Endocrine:  Denies polyuria or polydipsia   Lymphatic:  Denies swollen glands   Psychiatric:  Denies depression or anxiety     OBJECTIVE:   Vitals:  Vitals:    24 0737   BP: 151/72   Pulse: 55   Resp: 18   Temp: 97.7 °F (36.5 °C)   SpO2:      Body mass index is 30.4 kg/m².  Systolic (24hrs), Av , Min:134 , Max:151     Diastolic (24hrs), Av, Min:63, Max:72      Intake/Output Summary (Last 24 hours) at 2024 1309  Last data filed at 2024 1057  Gross per 24 hour   Intake 480 ml   Output 1100 ml   Net -620 ml     Weight (last 2 days)       Date/Time Weight    24 0600 96.1 (211.86)    12/10/24 0600 97 (213.85)              PHYSICAL EXAMS:  General:  Patient is not in acute distress, laying in the bed comfortably, awake, alert responding to commands.  Head: Normocephalic, Atraumatic.  HEENT:  Both pupils normal-size atraumatic, normocephalic, nonicteric  Neck:  JVP not raised. Trachea central  Respiratory: Decreased breath sounds bilateral  Cardiovascular: Giller irregular with 3/6 systolic ejection murmur in the right sternal border  GI:  Abdomen soft nontender. Liver and spleen normal size  Lymphatic:  No cervical or inguinal lymphadenopathy  Neurologic:  Patient is awake alert, responding to command, well-oriented to time and place and person moving   Extremities 1+ edema    LABORATORY RESULTS:        CBC with diff:   Results from last 7 days    Lab Units 12/12/24 0436 12/11/24  0955 12/10/24  0559 12/07/24  0555 12/06/24  1707   WBC Thousand/uL 6.04 6.86 7.06   < > 9.63   HEMOGLOBIN g/dL 11.5* 10.9* 10.7*   < > 10.3*   HEMATOCRIT % 37.3 36.2* 33.9*   < > 33.4*   MCV fL 98 98 97   < > 98   PLATELETS Thousands/uL 137* 141* 125*   < > 141*   RBC Million/uL 3.80* 3.69* 3.50*   < > 3.40*   MCH pg 30.3 29.5 30.6   < > 30.3   MCHC g/dL 30.8* 30.1* 31.6   < > 30.8*   RDW % 15.3* 15.5* 15.4*   < > 15.2*   MPV fL 10.9 10.8 10.6   < > 11.3   NRBC AUTO /100 WBCs  --  0  --   --  0    < > = values in this interval not displayed.       CMP:  Results from last 7 days   Lab Units 12/12/24 0436 12/11/24 0955 12/10/24  0559 12/07/24  0555 12/06/24  1803   POTASSIUM mmol/L 3.9 4.0 3.7   < > 4.5   CHLORIDE mmol/L 92* 89* 91*   < > 93*   CO2 mmol/L 37* 38* 37*   < > 34*   BUN mg/dL 58* 52* 53*   < > 61*   CREATININE mg/dL 2.01* 1.98* 1.97*   < > 2.15*   CALCIUM mg/dL 9.1 8.8 9.0   < > 8.9   AST U/L  --   --   --   --  10*   ALT U/L  --   --   --   --  11   ALK PHOS U/L  --   --   --   --  40   EGFR ml/min/1.73sq m 28 29 29   < > 26    < > = values in this interval not displayed.       BMP:  Results from last 7 days   Lab Units 12/12/24 0436 12/11/24  0955 12/10/24  0559   POTASSIUM mmol/L 3.9 4.0 3.7   CHLORIDE mmol/L 92* 89* 91*   CO2 mmol/L 37* 38* 37*   BUN mg/dL 58* 52* 53*   CREATININE mg/dL 2.01* 1.98* 1.97*   CALCIUM mg/dL 9.1 8.8 9.0            Results from last 7 days   Lab Units 12/12/24  0436 12/09/24  0443 12/08/24  1011   MAGNESIUM mg/dL 2.1 2.1 2.1     Results from last 7 days   Lab Units 12/06/24  2124   HEMOGLOBIN A1C % 8.5*               Lipid Profile:   Lab Results   Component Value Date    CHOL 190 12/01/2015    CHOL 210 07/14/2015    CHOL 198 09/11/2014     Lab Results   Component Value Date    HDL 46 06/11/2024    HDL 44 01/30/2024    HDL 57 01/10/2023     Lab Results   Component Value Date    LDLCALC 125 (H) 06/11/2024    LDLCALC 131 (H) 01/30/2024     LDLCALC 139 (H) 01/10/2023     Lab Results   Component Value Date    TRIG 138 2024    TRIG 135 2024    TRIG 94 01/10/2023       Cardiac testing:  Results for orders placed during the hospital encounter of 21    Echo complete with contrast if indicated    [facility]  [address]  [place] [address]  [phone]    Transthoracic Echocardiogram  2D, M-mode, Doppler, and Color Doppler    Study date:  26-May-2021    Patient: NUZHAT BARBER  MR number: [id number]  Account number: [id number]  : 1935  Age: 85 years  Gender: Male  Status: Outpatient  Location: [facility]  Height: 69 in  Weight: 221 lb  BP: 122/ 86 mmHg    Indications: Aortic Stenosis.    Diagnoses: I35.0 - Nonrheumatic aortic (valve) stenosis    Sonographer:  RIP Hunt  Primary Physician:  Beto Garnett DO  Referring Physician:  Katlin Sweeney MD  Group:  [facility]  Interpreting Physician:  Arabella Kenny MD    SUMMARY    LEFT VENTRICLE:  Systolic function was normal. Ejection fraction was estimated to be 55 %.  Although no diagnostic regional wall motion abnormality was identified, this possibility cannot be completely excluded on the basis of this study.  Wall thickness was mildly increased.  There was mild concentric hypertrophy.    RIGHT VENTRICLE:  The size was normal.  Systolic function was normal.    LEFT ATRIUM:  The atrium was mildly dilated.    RIGHT ATRIUM:  The atrium was mildly dilated.    MITRAL VALVE:  There was moderate annular calcification.  There was mild regurgitation.    AORTIC VALVE:  The valve was trileaflet. Leaflets exhibited moderately increased thickness, moderate calcification, and mildly reduced cuspal separation.  Transaortic velocity and gradient were increased due to stenosis as well as concomitant increased transaortic flow.  There was mild stenosis.  There was mild regurgitation.  Valve peak gradient was  30 mmHg.  Valve mean gradient was 19 mmHg.  Aortic valve area was 1.8 cmï¾² by the continuity equation.    TRICUSPID VALVE:  There was mild regurgitation.  Pulmonary artery systolic pressure was within the normal range.    AORTA:  The root exhibited dilatation - 4.0 cm.    HISTORY: PRIOR HISTORY: CAD, CVA, Diabetes Mellitus, Atrial Fibrillation, CKD III, DVT, TIA, Stent.    PROCEDURE: The study was performed in the [facility]. This was a routine study. The transthoracic approach was used. The study included complete 2D imaging, M-mode, complete spectral Doppler, and color Doppler. The  heart rate was 68 bpm, at the start of the study. Images were obtained from the parasternal, apical, subcostal, and suprasternal notch acoustic windows. Echocardiographic views were limited due to chest wall deformity and decreased  penetration. This was a technically difficult study.    LEFT VENTRICLE: Size was normal. Systolic function was normal. Ejection fraction was estimated to be 55 %. Although no diagnostic regional wall motion abnormality was identified, this possibility cannot be completely excluded on the basis  of this study. Wall thickness was mildly increased. There was mild concentric hypertrophy. No evidence of apical thrombus.    RIGHT VENTRICLE: The size was normal. Systolic function was normal. Wall thickness was normal.    LEFT ATRIUM: The atrium was mildly dilated.    RIGHT ATRIUM: The atrium was mildly dilated.    MITRAL VALVE: There was moderate annular calcification. There was normal leaflet separation. DOPPLER: The transmitral velocity was within the normal range. There was no evidence for stenosis. There was mild regurgitation.    AORTIC VALVE: The valve was trileaflet. Leaflets exhibited moderately increased thickness, moderate calcification, and mildly reduced cuspal separation. DOPPLER: Transaortic velocity and gradient were increased due to stenosis as well as  concomitant increased  transaortic flow. There was mild stenosis. There was mild regurgitation.    TRICUSPID VALVE: The valve structure was normal. There was normal leaflet separation. DOPPLER: The transtricuspid velocity was within the normal range. There was no evidence for stenosis. There was mild regurgitation. Pulmonary artery  systolic pressure was within the normal range.    PULMONIC VALVE: Leaflets exhibited normal thickness, no calcification, and normal cuspal separation. DOPPLER: The transpulmonic velocity was within the normal range. There was no significant regurgitation.    PERICARDIUM: There was no pericardial effusion. The pericardium was normal in appearance.    AORTA: The root exhibited dilatation - 4.0 cm.    SYSTEMIC VEINS: IVC: The inferior vena cava was normal in size. Respirophasic changes were normal.    MEASUREMENT TABLES    DOPPLER MEASUREMENTS  Aortic valve   (Reference normals)  Peak gradient   30 mmHg   (--)  Mean gradient   19 mmHg   (--)  Valve area, cont   1.8 cmï¾²   (--)    SYSTEM MEASUREMENT TABLES    2D  %FS: 29.82 %  Ao Diam: 3.98 cm  EDV(Teich): 142.11 ml  EF(Teich): 56.44 %  ESV(Teich): 61.9 ml  IVSd: 1.29 cm  LA Area: 24.41 cm2  LA Diam: 5.07 cm  LVEDV MOD A4C: 147.44 ml  LVEF MOD A4C: 59.76 %  LVESV MOD A4C: 59.32 ml  LVIDd: 5.41 cm  LVIDs: 3.8 cm  LVLd A4C: 8.48 cm  LVLs A4C: 7.15 cm  LVOT Diam: 2.34 cm  LVPWd: 1.19 cm  RA Area: 23.34 cm2  RVIDd: 4.46 cm  SV MOD A4C: 88.12 ml  SV(Teich): 80.2 ml    CW  AV Env.Ti: 302.95 ms  AV MaxP.17 mmHg  AV VTI: 54.96 cm  AV Vmax: 2.4 m/s  AV Vmean: 1.81 m/s  AV meanP.48 mmHg  TR MaxP.57 mmHg  TR Vmax: 2.72 m/s    MM  TAPSE: 2.23 cm    PW  RICHY (VTI): 1.45 cm2  RICHY Vmax: 1.41 cm2  LVOT Env.Ti: 340.63 ms  LVOT VTI: 18.57 cm  LVOT Vmax: 0.79 m/s  LVOT Vmean: 0.55 m/s  LVOT maxP.52 mmHg  LVOT meanP.35 mmHg  LVSV Dopp: 79.59 ml    [facility] Accredited Echocardiography Laboratory    Prepared and electronically signed  "by    Arabella Kenny MD  Signed 27-May-2021 08:36:11    No results found for this or any previous visit.    No results found for this or any previous visit.    No valid procedures specified.  No results found for this or any previous visit.      Meds/Allergies   all current active meds have been reviewed    Medications Prior to Admission:     acetaminophen (TYLENOL) 500 mg tablet    ALPRAZolam (NIRAVAM) 0.25 MG dissolvable tablet    amLODIPine (NORVASC) 10 mg tablet    apixaban (ELIQUIS) 2.5 mg    aspirin (ECOTRIN LOW STRENGTH) 81 mg EC tablet    Blood Glucose Monitoring Suppl (ONE TOUCH ULTRA MINI) w/Device KIT    cholecalciferol (VITAMIN D3) 1,000 units tablet    finasteride (PROSCAR) 5 mg tablet    furosemide (LASIX) 20 mg tablet    gabapentin (NEURONTIN) 100 mg capsule    glipiZIDE (GLUCOTROL XL) 2.5 mg 24 hr tablet    glucose blood (OneTouch Ultra) test strip    isosorbide dinitrate (ISORDIL) 10 mg tablet    lisinopril (ZESTRIL) 20 mg tablet    metoprolol tartrate (LOPRESSOR) 25 mg tablet    Omega-3 Fatty Acids (FISH OIL CONCENTRATE PO)    vitamin B-12 (CYANOCOBALAMIN) 100 MCG tablet           Portions of the record may have been created with voice recognition software.  Occasional wrong word or \"sound a like\" substitutions may have occurred due to the inherent limitations of voice recognition software.  Read the chart carefully and recognize, using context, where substitutions have occurred.   " MED

## 2025-01-13 ENCOUNTER — OUTPATIENT (OUTPATIENT)
Dept: OUTPATIENT SERVICES | Facility: HOSPITAL | Age: 70
LOS: 1 days | End: 2025-01-13
Payer: MEDICARE

## 2025-01-13 ENCOUNTER — APPOINTMENT (OUTPATIENT)
Dept: MAMMOGRAPHY | Facility: CLINIC | Age: 70
End: 2025-01-13
Payer: MEDICARE

## 2025-01-13 DIAGNOSIS — Z00.8 ENCOUNTER FOR OTHER GENERAL EXAMINATION: ICD-10-CM

## 2025-01-13 DIAGNOSIS — Z98.890 OTHER SPECIFIED POSTPROCEDURAL STATES: Chronic | ICD-10-CM

## 2025-01-13 PROCEDURE — 77067 SCR MAMMO BI INCL CAD: CPT | Mod: 26

## 2025-01-13 PROCEDURE — 77063 BREAST TOMOSYNTHESIS BI: CPT | Mod: 26

## 2025-01-13 PROCEDURE — 77063 BREAST TOMOSYNTHESIS BI: CPT

## 2025-01-13 PROCEDURE — 77067 SCR MAMMO BI INCL CAD: CPT

## 2025-01-13 RX ORDER — TIRZEPATIDE 5 MG/.5ML
5 INJECTION, SOLUTION SUBCUTANEOUS
Qty: 3 | Refills: 1 | Status: ACTIVE | COMMUNITY
Start: 2025-01-13 | End: 1900-01-01

## 2025-03-24 ENCOUNTER — OFFICE (OUTPATIENT)
Dept: URBAN - METROPOLITAN AREA CLINIC 100 | Facility: CLINIC | Age: 70
Setting detail: OPHTHALMOLOGY
End: 2025-03-24
Payer: MEDICARE

## 2025-03-24 DIAGNOSIS — E11.9: ICD-10-CM

## 2025-03-24 DIAGNOSIS — H26.493: ICD-10-CM

## 2025-03-24 DIAGNOSIS — H40.1131: ICD-10-CM

## 2025-03-24 DIAGNOSIS — Z96.1: ICD-10-CM

## 2025-03-24 PROCEDURE — 99214 OFFICE O/P EST MOD 30 MIN: CPT | Performed by: OPHTHALMOLOGY

## 2025-03-24 ASSESSMENT — REFRACTION_CURRENTRX
OD_SPHERE: +0.50
OS_OVR_VA: 20/
OS_AXIS: 091
OS_CYLINDER: -0.75
OD_AXIS: 096
OD_OVR_VA: 20/
OD_CYLINDER: -0.50
OD_VPRISM_DIRECTION: PROGS
OS_SPHERE: +1.00
OS_ADD: +2.75
OD_ADD: +2.75
OS_VPRISM_DIRECTION: PROGS

## 2025-03-24 ASSESSMENT — PACHYMETRY
OS_CT_CORRECTION: -2
OD_CT_UM: 575
OD_CT_CORRECTION: -2
OS_CT_UM: 573

## 2025-03-24 ASSESSMENT — REFRACTION_MANIFEST
OD_VA1: 20/20
OD_ADD: +2.75
OS_SPHERE: +1.00
OD_SPHERE: +0.50
OS_CYLINDER: -0.75
OD_VA1: 20/20
OU_VA: 20/20
OD_AXIS: 100
OS_SPHERE: +1.00
OS_VA1: 20/20-
OD_SPHERE: +0.50
OS_ADD: +2.75
OS_VA1: 20/20-
OD_ADD: +2.75
OS_AXIS: 100
OD_CYLINDER: -0.50
OS_ADD: +2.75
OU_VA: 20/20
OS_AXIS: 100
OD_AXIS: 100
OS_CYLINDER: -0.75
OD_CYLINDER: -0.50

## 2025-03-24 ASSESSMENT — REFRACTION_AUTOREFRACTION
OS_CYLINDER: -2.00
OD_AXIS: 095
OD_SPHERE: +1.25
OS_SPHERE: +1.75
OS_AXIS: 091
OD_CYLINDER: -1.25

## 2025-03-24 ASSESSMENT — VISUAL ACUITY
OS_BCVA: 20/25
OD_BCVA: 20/50

## 2025-03-24 ASSESSMENT — KERATOMETRY
OS_AXISANGLE_DEGREES: 174
OD_K2POWER_DIOPTERS: 46.50
OD_K1POWER_DIOPTERS: 44.75
OS_K1POWER_DIOPTERS: 44.50
METHOD_AUTO_MANUAL: AUTO
OS_K2POWER_DIOPTERS: 46.75
OD_AXISANGLE_DEGREES: 022

## 2025-03-24 ASSESSMENT — CONFRONTATIONAL VISUAL FIELD TEST (CVF)
OS_FINDINGS: FULL
OD_FINDINGS: FULL

## 2025-03-24 ASSESSMENT — TONOMETRY: OS_IOP_MMHG: 14

## 2025-04-21 ENCOUNTER — NON-APPOINTMENT (OUTPATIENT)
Age: 70
End: 2025-04-21

## 2025-04-23 ENCOUNTER — APPOINTMENT (OUTPATIENT)
Dept: ENDOCRINOLOGY | Facility: CLINIC | Age: 70
End: 2025-04-23
Payer: MEDICARE

## 2025-04-23 VITALS
OXYGEN SATURATION: 98 % | HEART RATE: 61 BPM | BODY MASS INDEX: 20.49 KG/M2 | DIASTOLIC BLOOD PRESSURE: 63 MMHG | HEIGHT: 64 IN | SYSTOLIC BLOOD PRESSURE: 100 MMHG | WEIGHT: 120 LBS

## 2025-04-23 DIAGNOSIS — E83.52 HYPERCALCEMIA: ICD-10-CM

## 2025-04-23 DIAGNOSIS — E11.9 TYPE 2 DIABETES MELLITUS W/OUT COMPLICATIONS: ICD-10-CM

## 2025-04-23 DIAGNOSIS — M85.80 OTHER SPECIFIED DISORDERS OF BONE DENSITY AND STRUCTURE, UNSPECIFIED SITE: ICD-10-CM

## 2025-04-23 PROCEDURE — 99214 OFFICE O/P EST MOD 30 MIN: CPT

## 2025-04-23 PROCEDURE — G0447 BEHAVIOR COUNSEL OBESITY 15M: CPT | Mod: 59

## 2025-04-23 PROCEDURE — 95251 CONT GLUC MNTR ANALYSIS I&R: CPT

## 2025-04-29 ENCOUNTER — NON-APPOINTMENT (OUTPATIENT)
Age: 70
End: 2025-04-29

## 2025-04-29 DIAGNOSIS — E04.2 NONTOXIC MULTINODULAR GOITER: ICD-10-CM

## 2025-04-29 RX ORDER — IBANDRONATE SODIUM 150 MG/1
150 TABLET ORAL
Refills: 0 | Status: ACTIVE | COMMUNITY
Start: 2025-04-29

## 2025-07-28 ENCOUNTER — OFFICE (OUTPATIENT)
Dept: URBAN - METROPOLITAN AREA CLINIC 100 | Facility: CLINIC | Age: 70
Setting detail: OPHTHALMOLOGY
End: 2025-07-28
Payer: MEDICARE

## 2025-07-28 DIAGNOSIS — H26.493: ICD-10-CM

## 2025-07-28 DIAGNOSIS — Z96.1: ICD-10-CM

## 2025-07-28 DIAGNOSIS — E11.9: ICD-10-CM

## 2025-07-28 DIAGNOSIS — H40.1131: ICD-10-CM

## 2025-07-28 PROCEDURE — 99214 OFFICE O/P EST MOD 30 MIN: CPT | Performed by: OPHTHALMOLOGY

## 2025-07-28 ASSESSMENT — REFRACTION_CURRENTRX
OD_OVR_VA: 20/
OD_AXIS: 096
OS_CYLINDER: -0.75
OD_CYLINDER: -0.50
OS_SPHERE: +1.00
OD_ADD: +2.75
OS_OVR_VA: 20/
OD_AXIS: 92
OD_SPHERE: +0.75
OD_VPRISM_DIRECTION: PROGS
OS_AXIS: 091
OS_ADD: +2.75
OS_OVR_VA: 20/
OS_SPHERE: +1.00
OD_CYLINDER: -0.50
OS_VPRISM_DIRECTION: PROGS
OS_AXIS: 86
OD_ADD: +2.75
OD_SPHERE: +0.50
OS_CYLINDER: -0.75
OD_OVR_VA: 20/
OS_ADD: +2.50

## 2025-07-28 ASSESSMENT — REFRACTION_MANIFEST
OD_ADD: +2.75
OS_SPHERE: +1.00
OU_VA: 20/20
OD_AXIS: 100
OS_VA1: 20/20-
OU_VA: 20/20
OS_CYLINDER: -0.75
OD_CYLINDER: -0.50
OS_AXIS: 100
OS_VA1: 20/20-
OD_ADD: +2.75
OS_CYLINDER: -0.75
OD_VA1: 20/20
OD_SPHERE: +0.50
OD_AXIS: 100
OD_SPHERE: +0.50
OD_VA1: 20/20
OS_SPHERE: +1.00
OS_ADD: +2.75
OD_CYLINDER: -0.50
OS_AXIS: 100
OS_ADD: +2.75

## 2025-07-28 ASSESSMENT — VISUAL ACUITY
OD_BCVA: 20/30-1
OS_BCVA: 20/25

## 2025-07-28 ASSESSMENT — TONOMETRY
OS_IOP_MMHG: 19
OD_IOP_MMHG: 20

## 2025-07-28 ASSESSMENT — KERATOMETRY
OD_K1POWER_DIOPTERS: 45.50
OS_K2POWER_DIOPTERS: 45.75
OD_AXISANGLE_DEGREES: 019
METHOD_AUTO_MANUAL: AUTO
OS_K1POWER_DIOPTERS: 44.50
OD_K2POWER_DIOPTERS: 46.50
OS_AXISANGLE_DEGREES: 003

## 2025-07-28 ASSESSMENT — REFRACTION_AUTOREFRACTION
OS_CYLINDER: -1.75
OS_SPHERE: +1.75
OD_AXIS: 099
OD_CYLINDER: -1.25
OS_AXIS: 088
OD_SPHERE: +0.50

## 2025-07-28 ASSESSMENT — PACHYMETRY
OD_CT_UM: 575
OD_CT_CORRECTION: -2
OS_CT_CORRECTION: -2
OS_CT_UM: 573